# Patient Record
Sex: FEMALE | Race: BLACK OR AFRICAN AMERICAN | Employment: UNEMPLOYED | URBAN - METROPOLITAN AREA
[De-identification: names, ages, dates, MRNs, and addresses within clinical notes are randomized per-mention and may not be internally consistent; named-entity substitution may affect disease eponyms.]

---

## 2022-07-12 LAB — GBS, EXTERNAL RESULT: NOT DETECTED

## 2022-07-16 ENCOUNTER — HOSPITAL ENCOUNTER (OUTPATIENT)
Age: 37
Discharge: HOME | End: 2022-07-16
Attending: OBSTETRICS & GYNECOLOGY | Admitting: OBSTETRICS & GYNECOLOGY

## 2022-07-16 VITALS
OXYGEN SATURATION: 98 % | TEMPERATURE: 98.8 F | BODY MASS INDEX: 40.98 KG/M2 | HEART RATE: 85 BPM | DIASTOLIC BLOOD PRESSURE: 78 MMHG | RESPIRATION RATE: 16 BRPM | WEIGHT: 255 LBS | SYSTOLIC BLOOD PRESSURE: 161 MMHG | HEIGHT: 66 IN

## 2022-07-16 PROBLEM — O16.9 HYPERTENSION AFFECTING PREGNANCY, ANTEPARTUM: Status: ACTIVE | Noted: 2022-07-16

## 2022-07-16 LAB
ALBUMIN SERPL-MCNC: 3.5 GM/DL (ref 3.4–5)
ALP BLD-CCNC: 124 IU/L (ref 40–129)
ALT SERPL-CCNC: 9 U/L (ref 10–40)
AMPHETAMINES: NEGATIVE
ANION GAP SERPL CALCULATED.3IONS-SCNC: 11 MMOL/L (ref 4–16)
AST SERPL-CCNC: 10 IU/L (ref 15–37)
BACTERIA: ABNORMAL /HPF
BARBITURATE SCREEN URINE: NEGATIVE
BASOPHILS ABSOLUTE: 0 K/CU MM
BASOPHILS RELATIVE PERCENT: 0.3 % (ref 0–1)
BENZODIAZEPINE SCREEN, URINE: NEGATIVE
BILIRUB SERPL-MCNC: 0.2 MG/DL (ref 0–1)
BILIRUBIN URINE: NEGATIVE MG/DL
BLOOD, URINE: ABNORMAL
BUN BLDV-MCNC: 5 MG/DL (ref 6–23)
CALCIUM SERPL-MCNC: 8.8 MG/DL (ref 8.3–10.6)
CANNABINOID SCREEN URINE: NEGATIVE
CHLORIDE BLD-SCNC: 103 MMOL/L (ref 99–110)
CLARITY: CLEAR
CO2: 21 MMOL/L (ref 21–32)
COCAINE METABOLITE: NEGATIVE
COLOR: YELLOW
CREAT SERPL-MCNC: 0.5 MG/DL (ref 0.6–1.1)
CREATININE URINE: 137.5 MG/DL (ref 28–217)
DIFFERENTIAL TYPE: ABNORMAL
EOSINOPHILS ABSOLUTE: 0.1 K/CU MM
EOSINOPHILS RELATIVE PERCENT: 1.8 % (ref 0–3)
GFR AFRICAN AMERICAN: >60 ML/MIN/1.73M2
GFR NON-AFRICAN AMERICAN: >60 ML/MIN/1.73M2
GLUCOSE BLD-MCNC: 119 MG/DL (ref 70–99)
GLUCOSE, URINE: NEGATIVE MG/DL
HCT VFR BLD CALC: 35.8 % (ref 37–47)
HEMOGLOBIN: 11.4 GM/DL (ref 12.5–16)
IMMATURE NEUTROPHIL %: 0.4 % (ref 0–0.43)
KETONES, URINE: ABNORMAL MG/DL
LACTATE DEHYDROGENASE: 137 IU/L (ref 120–246)
LEUKOCYTE ESTERASE, URINE: NEGATIVE
LYMPHOCYTES ABSOLUTE: 2 K/CU MM
LYMPHOCYTES RELATIVE PERCENT: 28.1 % (ref 24–44)
MCH RBC QN AUTO: 29.8 PG (ref 27–31)
MCHC RBC AUTO-ENTMCNC: 31.8 % (ref 32–36)
MCV RBC AUTO: 93.5 FL (ref 78–100)
MONOCYTES ABSOLUTE: 0.4 K/CU MM
MONOCYTES RELATIVE PERCENT: 5.8 % (ref 0–4)
MUCUS: ABNORMAL HPF
NITRITE URINE, QUANTITATIVE: NEGATIVE
NUCLEATED RBC %: 0 %
OPIATES, URINE: NEGATIVE
OXYCODONE: NEGATIVE
PDW BLD-RTO: 14.7 % (ref 11.7–14.9)
PH, URINE: 6 (ref 5–8)
PHENCYCLIDINE, URINE: NEGATIVE
PLATELET # BLD: 225 K/CU MM (ref 140–440)
PMV BLD AUTO: 10.2 FL (ref 7.5–11.1)
POTASSIUM SERPL-SCNC: 3.9 MMOL/L (ref 3.5–5.1)
PROT/CREAT RATIO, UR: 0.2
PROTEIN UA: NEGATIVE MG/DL
RBC # BLD: 3.83 M/CU MM (ref 4.2–5.4)
RBC URINE: 3 /HPF (ref 0–6)
SEGMENTED NEUTROPHILS ABSOLUTE COUNT: 4.6 K/CU MM
SEGMENTED NEUTROPHILS RELATIVE PERCENT: 63.6 % (ref 36–66)
SODIUM BLD-SCNC: 135 MMOL/L (ref 135–145)
SPECIFIC GRAVITY UA: 1.02 (ref 1–1.03)
SQUAMOUS EPITHELIAL: 11 /HPF
TOTAL IMMATURE NEUTOROPHIL: 0.03 K/CU MM
TOTAL NUCLEATED RBC: 0 K/CU MM
TOTAL PROTEIN: 6.4 GM/DL (ref 6.4–8.2)
URIC ACID: 3.8 MG/DL (ref 2.6–6)
URINE TOTAL PROTEIN: 32.9 MG/DL
UROBILINOGEN, URINE: NORMAL MG/DL (ref 0.2–1)
WBC # BLD: 7.3 K/CU MM (ref 4–10.5)
WBC UA: 1 /HPF (ref 0–5)

## 2022-07-16 PROCEDURE — 99213 OFFICE O/P EST LOW 20 MIN: CPT

## 2022-07-16 PROCEDURE — 85025 COMPLETE CBC W/AUTO DIFF WBC: CPT

## 2022-07-16 PROCEDURE — 84156 ASSAY OF PROTEIN URINE: CPT

## 2022-07-16 PROCEDURE — 80053 COMPREHEN METABOLIC PANEL: CPT

## 2022-07-16 PROCEDURE — 83615 LACTATE (LD) (LDH) ENZYME: CPT

## 2022-07-16 PROCEDURE — 82570 ASSAY OF URINE CREATININE: CPT

## 2022-07-16 PROCEDURE — 81001 URINALYSIS AUTO W/SCOPE: CPT

## 2022-07-16 PROCEDURE — 84550 ASSAY OF BLOOD/URIC ACID: CPT

## 2022-07-16 PROCEDURE — 80307 DRUG TEST PRSMV CHEM ANLYZR: CPT

## 2022-07-16 RX ORDER — METHYLDOPA 500 MG/1
500 TABLET, FILM COATED ORAL 3 TIMES DAILY
Status: ON HOLD | COMMUNITY
End: 2022-07-20 | Stop reason: HOSPADM

## 2022-07-16 RX ORDER — ACETAMINOPHEN 325 MG/1
650 TABLET ORAL EVERY 4 HOURS PRN
Status: DISCONTINUED | OUTPATIENT
Start: 2022-07-16 | End: 2022-07-16 | Stop reason: HOSPADM

## 2022-07-16 NOTE — FLOWSHEET NOTE
EFM and TOCO on pt states baby has been active and moving pt denies any tender spots to touch on abdomen, pt denies any vaginal LOF does c/o some vaginal blood and slime, pt denies recent IC or SVE, pt states has been here in [facility] for two months, pt states came from [facility] pt states has had 3 prenatal visits, here but was getting regular PNC in [facility], denies issues with this pregnancy with exception of blood pressure, pt states is on Aldomet and has been on Aldomet most of pregnancy, pt states has mild headache, denies any epigastric discomfort or blurred vision Ob history taken pt is 37/1, pt states was on Nifedipine at first due to chronic hypertension.

## 2022-07-16 NOTE — FLOWSHEET NOTE
Presents to L/D via wheelchair with c./o vaginal blood and slime shown to LT 05 instructed to obtain urine for CCMS change into gown call when ready.

## 2022-07-17 ENCOUNTER — HOSPITAL ENCOUNTER (INPATIENT)
Age: 37
LOS: 3 days | Discharge: HOME | DRG: 560 | End: 2022-07-20
Attending: OBSTETRICS & GYNECOLOGY | Admitting: OBSTETRICS & GYNECOLOGY
Payer: MEDICAID

## 2022-07-17 PROBLEM — O16.3 ELEVATED BLOOD PRESSURE AFFECTING PREGNANCY IN THIRD TRIMESTER, ANTEPARTUM: Status: ACTIVE | Noted: 2022-07-17

## 2022-07-17 LAB
ABO/RH: NORMAL
AMPHETAMINES: NEGATIVE
ANTIBODY SCREEN: NEGATIVE
BARBITURATE SCREEN URINE: NEGATIVE
BENZODIAZEPINE SCREEN, URINE: NEGATIVE
CANNABINOID SCREEN URINE: NEGATIVE
COCAINE METABOLITE: NEGATIVE
HCT VFR BLD CALC: 35.3 % (ref 37–47)
HEMOGLOBIN: 10.9 GM/DL (ref 12.5–16)
MCH RBC QN AUTO: 29.1 PG (ref 27–31)
MCHC RBC AUTO-ENTMCNC: 30.9 % (ref 32–36)
MCV RBC AUTO: 94.1 FL (ref 78–100)
OPIATES, URINE: NEGATIVE
OXYCODONE: NEGATIVE
PDW BLD-RTO: 14.9 % (ref 11.7–14.9)
PHENCYCLIDINE, URINE: NEGATIVE
PLATELET # BLD: 235 K/CU MM (ref 140–440)
PMV BLD AUTO: 10.6 FL (ref 7.5–11.1)
RBC # BLD: 3.75 M/CU MM (ref 4.2–5.4)
WBC # BLD: 7.8 K/CU MM (ref 4–10.5)

## 2022-07-17 PROCEDURE — 1220000000 HC SEMI PRIVATE OB R&B

## 2022-07-17 PROCEDURE — 86850 RBC ANTIBODY SCREEN: CPT

## 2022-07-17 PROCEDURE — 86900 BLOOD TYPING SEROLOGIC ABO: CPT

## 2022-07-17 PROCEDURE — 2500000003 HC RX 250 WO HCPCS

## 2022-07-17 PROCEDURE — 80307 DRUG TEST PRSMV CHEM ANLYZR: CPT

## 2022-07-17 PROCEDURE — 2580000003 HC RX 258

## 2022-07-17 PROCEDURE — 6360000002 HC RX W HCPCS

## 2022-07-17 PROCEDURE — 86901 BLOOD TYPING SEROLOGIC RH(D): CPT

## 2022-07-17 PROCEDURE — 6370000000 HC RX 637 (ALT 250 FOR IP)

## 2022-07-17 PROCEDURE — 85027 COMPLETE CBC AUTOMATED: CPT

## 2022-07-17 RX ORDER — SODIUM CHLORIDE, SODIUM LACTATE, POTASSIUM CHLORIDE, CALCIUM CHLORIDE 600; 310; 30; 20 MG/100ML; MG/100ML; MG/100ML; MG/100ML
INJECTION, SOLUTION INTRAVENOUS CONTINUOUS
Status: DISCONTINUED | OUTPATIENT
Start: 2022-07-17 | End: 2022-07-18

## 2022-07-17 RX ORDER — LIDOCAINE HYDROCHLORIDE 10 MG/ML
30 INJECTION, SOLUTION EPIDURAL; INFILTRATION; INTRACAUDAL; PERINEURAL PRN
Status: DISCONTINUED | OUTPATIENT
Start: 2022-07-17 | End: 2022-07-18

## 2022-07-17 RX ORDER — ONDANSETRON 2 MG/ML
4 INJECTION INTRAMUSCULAR; INTRAVENOUS EVERY 6 HOURS PRN
Status: DISCONTINUED | OUTPATIENT
Start: 2022-07-17 | End: 2022-07-18

## 2022-07-17 RX ORDER — SODIUM CHLORIDE 0.9 % (FLUSH) 0.9 %
5-40 SYRINGE (ML) INJECTION PRN
Status: DISCONTINUED | OUTPATIENT
Start: 2022-07-17 | End: 2022-07-18

## 2022-07-17 RX ORDER — SODIUM CHLORIDE 9 MG/ML
25 INJECTION, SOLUTION INTRAVENOUS PRN
Status: DISCONTINUED | OUTPATIENT
Start: 2022-07-17 | End: 2022-07-18

## 2022-07-17 RX ORDER — FENTANYL CITRATE 50 UG/ML
100 INJECTION, SOLUTION INTRAMUSCULAR; INTRAVENOUS
Status: DISCONTINUED | OUTPATIENT
Start: 2022-07-17 | End: 2022-07-18

## 2022-07-17 RX ORDER — FAMOTIDINE 10 MG/ML
20 INJECTION, SOLUTION INTRAVENOUS 2 TIMES DAILY PRN
Status: DISCONTINUED | OUTPATIENT
Start: 2022-07-17 | End: 2022-07-18

## 2022-07-17 RX ORDER — DOCUSATE SODIUM 100 MG/1
100 CAPSULE, LIQUID FILLED ORAL 2 TIMES DAILY
Status: DISCONTINUED | OUTPATIENT
Start: 2022-07-17 | End: 2022-07-17

## 2022-07-17 RX ADMIN — SODIUM CHLORIDE, POTASSIUM CHLORIDE, SODIUM LACTATE AND CALCIUM CHLORIDE: 600; 310; 30; 20 INJECTION, SOLUTION INTRAVENOUS at 22:48

## 2022-07-17 RX ADMIN — Medication 1 MILLI-UNITS/MIN: at 20:57

## 2022-07-17 RX ADMIN — SODIUM CHLORIDE, POTASSIUM CHLORIDE, SODIUM LACTATE AND CALCIUM CHLORIDE: 600; 310; 30; 20 INJECTION, SOLUTION INTRAVENOUS at 09:50

## 2022-07-17 RX ADMIN — Medication 25 MCG: at 09:52

## 2022-07-17 RX ADMIN — FAMOTIDINE 20 MG: 10 INJECTION, SOLUTION INTRAVENOUS at 19:22

## 2022-07-17 RX ADMIN — Medication 25 MCG: at 14:11

## 2022-07-17 RX ADMIN — SODIUM CHLORIDE, POTASSIUM CHLORIDE, SODIUM LACTATE AND CALCIUM CHLORIDE: 600; 310; 30; 20 INJECTION, SOLUTION INTRAVENOUS at 16:00

## 2022-07-17 ASSESSMENT — PAIN DESCRIPTION - ORIENTATION
ORIENTATION: LOWER

## 2022-07-17 ASSESSMENT — PAIN DESCRIPTION - LOCATION
LOCATION: BACK

## 2022-07-17 ASSESSMENT — PAIN DESCRIPTION - DESCRIPTORS
DESCRIPTORS: DISCOMFORT

## 2022-07-17 ASSESSMENT — PAIN SCALES - GENERAL
PAINLEVEL_OUTOF10: 2
PAINLEVEL_OUTOF10: 0
PAINLEVEL_OUTOF10: 2

## 2022-07-17 ASSESSMENT — PAIN DESCRIPTION - PAIN TYPE
TYPE: ACUTE PAIN

## 2022-07-17 ASSESSMENT — PAIN DESCRIPTION - ONSET
ONSET: ON-GOING

## 2022-07-17 ASSESSMENT — PAIN DESCRIPTION - FREQUENCY
FREQUENCY: INTERMITTENT

## 2022-07-17 ASSESSMENT — PAIN - FUNCTIONAL ASSESSMENT
PAIN_FUNCTIONAL_ASSESSMENT: ACTIVITIES ARE NOT PREVENTED

## 2022-07-17 NOTE — PLAN OF CARE
Problem: Vaginal Birth or  Section  Goal: Fetal and maternal status remain reassuring during the birth process  Description:  Birth OB-Pregnancy care plan goal which identifies if the fetal and maternal status remain reassuring during the birth process  Outcome: Progressing     Problem: Postpartum  Goal: Experiences normal postpartum course  Description:  Postpartum OB-Pregnancy care plan goal which identifies if the mother is experiencing a normal postpartum course  Outcome: Progressing  Goal: Appropriate maternal -  bonding  Description:  Postpartum OB-Pregnancy care plan goal which identifies if the mother and  are bonding appropriately  Outcome: Progressing  Goal: Establishment of infant feeding pattern  Description:  Postpartum OB-Pregnancy care plan goal which identifies if the mother is establishing a feeding pattern with their   Outcome: Progressing  Goal: Incisions, wounds, or drain sites healing without S/S of infection  Outcome: Progressing     Problem: Pain  Goal: Verbalizes/displays adequate comfort level or baseline comfort level  Outcome: Progressing     Problem: Infection - Adult  Goal: Absence of infection during hospitalization  Outcome: Progressing     Problem: Discharge Planning  Goal: Discharge to home or other facility with appropriate resources  Outcome: Progressing

## 2022-07-17 NOTE — PROGRESS NOTES
Department of Obstetrics and Gynecology  Labor and Delivery   Midwifery Progress Note      SUBJECTIVE:  Pt comfortable but feeling some mild cramping. OBJECTIVE:      Fetal heart rate:       Baseline Heart Rate:  125        Accelerations:  present       Variability:  Moderate with periods of minimal overall reassuring        Decelerations:  absent         Contraction frequency: 3 minutes    Membranes:  Intact    Cervix:         Dilation:  Closed         Effacement:  Long         Station:  -3         Position:  mid             ASSESSMENT   Pt comfortable   FHT- Reactive   SVE- unchanged    PLAN:  Will place 2nd Cytotec   Anticipate active labor     I have collaborated and updated Dr Ginette Pennington and the MD agrees with the current POC.      ISABELL Watkins - C[place]

## 2022-07-17 NOTE — PROGRESS NOTES
Department of Obstetrics and Gynecology  Labor and Delivery   Midwifery Progress Note      SUBJECTIVE:  Pt comfortable with Contractions    OBJECTIVE:      Fetal heart rate:       Baseline Heart Rate:  135        Accelerations:  present       Variability:  moderate       Decelerations:  absent         Contraction frequency: Irregular     Membranes:  Intact    Cervix:         Dilation:  1-2 cm         Effacement:  75%         Station:  -3         Position:  posterior             ASSESSMENT   Pt Comfortable with Contractions  Discussed leal ind vs pit ind. Pt would like to Try Leal. Attempted to place Leal unsuccessful- Pt tolerated well. opt for Pitocin induction  SVE- 1-2/ 70/ ballotable    PLAN:  Will start pitocin   Anticipate active labor     I have collaborated and updated Dr Thien Martin and the MD agrees with the current POC.      ISABELL Dumont CNM

## 2022-07-17 NOTE — H&P
Department of Obstetrics and Gynecology   Obstetrics History and Physical        CHIEF COMPLAINT: Here For Cytotec induction for CHTN        HISTORY OF PRESENT ILLNESS:      The patient is a 44 y.o. female at 42w2d. OB History          3    Para   2    Term   2            AB        Living   2         SAB        IAB        Ectopic        Molar        Multiple        Live Births   2            Patient presents with a chief complaint as above and is being admitted for Chronic Hypertension    Estimated Due Date: Estimated Date of Delivery: 22    PRENATAL CARE:    Complicated by: chronic hypertension and obesity    PAST OB HISTORY  OB History          3    Para   2    Term   2            AB        Living   2         SAB        IAB        Ectopic        Molar        Multiple        Live Births   2                Past Medical History:        Diagnosis Date    Hypertension      Past Surgical History:        Procedure Laterality Date    DILATION AND CURETTAGE OF UTERUS      @ 8 yeras ago missed AB     Allergies:  Patient has no known allergies.   Social History:    Social History     Socioeconomic History    Marital status: Single     Spouse name: Not on file    Number of children: Not on file    Years of education: Not on file    Highest education level: Not on file   Occupational History    Not on file   Tobacco Use    Smoking status: Never    Smokeless tobacco: Never   Substance and Sexual Activity    Alcohol use: Not Currently    Drug use: Never    Sexual activity: Yes     Partners: Male   Other Topics Concern    Not on file   Social History Narrative    Not on file     Social Determinants of Health     Financial Resource Strain: Not on file   Food Insecurity: Not on file   Transportation Needs: Not on file   Physical Activity: Not on file   Stress: Not on file   Social Connections: Not on file   Intimate Partner Violence: Not on file   Housing Stability: Not on file     Family History: Problem Relation Age of Onset    Diabetes Mother      Medications Prior to Admission:  Medications Prior to Admission: methyldopa (ALDOMET) 500 MG tablet, Take 500 mg by mouth in the morning and 500 mg at noon and 500 mg before bedtime. REVIEW OF SYSTEMS:    CONSTITUTIONAL:  negative  RESPIRATORY:  negative  CARDIOVASCULAR:  negative  GASTROINTESTINAL:  negative  ALLERGIC/IMMUNOLOGIC:  negative  NEUROLOGICAL:  negative  BEHAVIOR/PSYCH:  negative    PHYSICAL EXAM:  There were no vitals taken for this visit. Lab Results   Component Value Date    WBC 7.3 07/16/2022    HGB 11.4 (L) 07/16/2022    HCT 35.8 (L) 07/16/2022    MCV 93.5 07/16/2022     07/16/2022       Blood Type O+  GBS- Negative     Pending Received Prenatal record From [facility]   Rubella-   RPR-  HIV-  Hep B-         General appearance:  awake, alert, cooperative, no apparent distress, and appears stated age  Neurologic:  Awake, alert, oriented to name, place and time. Lungs:  No increased work of breathing, good air exchange  Abdomen:  Soft, non tender, gravid, consistent with her gestational age,     Fetal heart rate:    Baseline Heart Rate:  135        Accelerations:  present       Long Term Variability:  moderate       Decelerations:  absent         Pelvis:  Adequate pelvis  Cervix: Closed Long firm -2      Contraction frequency:  none     Membranes:  Intact    ASSESSMENT AND PLAN:    Labor: Admit, anticipate normal delivery, routine labor orders  Fetus: Reassuring  GBS:negative  Other: plan cytotec for cervical ripening and labor induction      I have collaborated and updated Dr. Slick Thomas  and the MD agrees with the current POC.     Carl Stevens, ISABELL - C[place]TON

## 2022-07-17 NOTE — DISCHARGE INSTRUCTIONS
LABOR    Call your doctor, or come to the hospital, if you have:    Contractions coming about every 3-5 minutes apart, for about one hour. Labor contractions are usually strong enough that you can not walk or talk while having the contractions. ( Contractions can feel like menstrual cramps, tightening in your abdomen, rectal pressure or a backache. This feeling comes and goes.)    Vaginal leaking of fluid. Heavy, bright red bleeding, like the days of your period. ( A little bloody show or spotting is normal in labor.)    ALWAYS CALL YOUR DOCTOR IF YOU:    Notice decreased movement of your baby, different from what you are used to. Have heavy, bright red bleeding, like the heavy days of your periods. Have vaginal leaking of fluid.   Return to [facility] tomorrow at 0800 for induction    Activity as tolerated    Diet as tolerated

## 2022-07-18 ENCOUNTER — ANESTHESIA (OUTPATIENT)
Dept: LABOR AND DELIVERY | Age: 37
DRG: 560 | End: 2022-07-18
Payer: MEDICAID

## 2022-07-18 ENCOUNTER — ANESTHESIA EVENT (OUTPATIENT)
Dept: LABOR AND DELIVERY | Age: 37
DRG: 560 | End: 2022-07-18
Payer: MEDICAID

## 2022-07-18 PROBLEM — Z3A.37 37 WEEKS GESTATION OF PREGNANCY: Status: ACTIVE | Noted: 2022-07-18

## 2022-07-18 LAB
ALBUMIN SERPL-MCNC: 3.2 GM/DL (ref 3.4–5)
ALP BLD-CCNC: 118 IU/L (ref 40–129)
ALT SERPL-CCNC: 10 U/L (ref 10–40)
ANION GAP SERPL CALCULATED.3IONS-SCNC: 12 MMOL/L (ref 4–16)
AST SERPL-CCNC: 14 IU/L (ref 15–37)
BASOPHILS ABSOLUTE: 0 K/CU MM
BASOPHILS RELATIVE PERCENT: 0.2 % (ref 0–1)
BILIRUB SERPL-MCNC: 0.3 MG/DL (ref 0–1)
BUN BLDV-MCNC: 5 MG/DL (ref 6–23)
CALCIUM SERPL-MCNC: 8.8 MG/DL (ref 8.3–10.6)
CHLORIDE BLD-SCNC: 101 MMOL/L (ref 99–110)
CO2: 21 MMOL/L (ref 21–32)
CREAT SERPL-MCNC: 0.4 MG/DL (ref 0.6–1.1)
DIFFERENTIAL TYPE: ABNORMAL
EKG ATRIAL RATE: 78 BPM
EKG DIAGNOSIS: NORMAL
EKG P AXIS: 25 DEGREES
EKG P-R INTERVAL: 158 MS
EKG Q-T INTERVAL: 374 MS
EKG QRS DURATION: 94 MS
EKG QTC CALCULATION (BAZETT): 426 MS
EKG R AXIS: 16 DEGREES
EKG T AXIS: 30 DEGREES
EKG VENTRICULAR RATE: 78 BPM
EOSINOPHILS ABSOLUTE: 0.1 K/CU MM
EOSINOPHILS RELATIVE PERCENT: 1 % (ref 0–3)
GFR AFRICAN AMERICAN: >60 ML/MIN/1.73M2
GFR NON-AFRICAN AMERICAN: >60 ML/MIN/1.73M2
GLUCOSE BLD-MCNC: 97 MG/DL (ref 70–99)
HCT VFR BLD CALC: 36.6 % (ref 37–47)
HEMOGLOBIN: 11.6 GM/DL (ref 12.5–16)
IMMATURE NEUTROPHIL %: 0.3 % (ref 0–0.43)
LYMPHOCYTES ABSOLUTE: 2 K/CU MM
LYMPHOCYTES RELATIVE PERCENT: 22 % (ref 24–44)
MCH RBC QN AUTO: 29.1 PG (ref 27–31)
MCHC RBC AUTO-ENTMCNC: 31.7 % (ref 32–36)
MCV RBC AUTO: 91.7 FL (ref 78–100)
MONOCYTES ABSOLUTE: 0.5 K/CU MM
MONOCYTES RELATIVE PERCENT: 5.3 % (ref 0–4)
NUCLEATED RBC %: 0 %
PDW BLD-RTO: 14.6 % (ref 11.7–14.9)
PLATELET # BLD: 203 K/CU MM (ref 140–440)
PMV BLD AUTO: 9.9 FL (ref 7.5–11.1)
POTASSIUM SERPL-SCNC: 3.7 MMOL/L (ref 3.5–5.1)
PRO-BNP: 9.74 PG/ML
RBC # BLD: 3.99 M/CU MM (ref 4.2–5.4)
SEGMENTED NEUTROPHILS ABSOLUTE COUNT: 6.3 K/CU MM
SEGMENTED NEUTROPHILS RELATIVE PERCENT: 71.2 % (ref 36–66)
SODIUM BLD-SCNC: 134 MMOL/L (ref 135–145)
TOTAL IMMATURE NEUTOROPHIL: 0.03 K/CU MM
TOTAL NUCLEATED RBC: 0 K/CU MM
TOTAL PROTEIN: 5.9 GM/DL (ref 6.4–8.2)
TROPONIN T: <0.01 NG/ML
WBC # BLD: 8.9 K/CU MM (ref 4–10.5)

## 2022-07-18 PROCEDURE — 6360000002 HC RX W HCPCS: Performed by: OBSTETRICS & GYNECOLOGY

## 2022-07-18 PROCEDURE — 84484 ASSAY OF TROPONIN QUANT: CPT

## 2022-07-18 PROCEDURE — 80053 COMPREHEN METABOLIC PANEL: CPT

## 2022-07-18 PROCEDURE — 3E033VJ INTRODUCTION OF OTHER HORMONE INTO PERIPHERAL VEIN, PERCUTANEOUS APPROACH: ICD-10-PCS | Performed by: OBSTETRICS & GYNECOLOGY

## 2022-07-18 PROCEDURE — 6370000000 HC RX 637 (ALT 250 FOR IP)

## 2022-07-18 PROCEDURE — 6360000002 HC RX W HCPCS

## 2022-07-18 PROCEDURE — 6360000002 HC RX W HCPCS: Performed by: NURSE ANESTHETIST, CERTIFIED REGISTERED

## 2022-07-18 PROCEDURE — 2580000003 HC RX 258: Performed by: OBSTETRICS & GYNECOLOGY

## 2022-07-18 PROCEDURE — 93005 ELECTROCARDIOGRAM TRACING: CPT | Performed by: OBSTETRICS & GYNECOLOGY

## 2022-07-18 PROCEDURE — 1220000000 HC SEMI PRIVATE OB R&B

## 2022-07-18 PROCEDURE — 7200000001 HC VAGINAL DELIVERY

## 2022-07-18 PROCEDURE — 3700000025 EPIDURAL BLOCK: Performed by: NURSE ANESTHETIST, CERTIFIED REGISTERED

## 2022-07-18 PROCEDURE — 93010 ELECTROCARDIOGRAM REPORT: CPT | Performed by: INTERNAL MEDICINE

## 2022-07-18 PROCEDURE — 6370000000 HC RX 637 (ALT 250 FOR IP): Performed by: OBSTETRICS & GYNECOLOGY

## 2022-07-18 PROCEDURE — 83880 ASSAY OF NATRIURETIC PEPTIDE: CPT

## 2022-07-18 PROCEDURE — 59409 OBSTETRICAL CARE: CPT | Performed by: OBSTETRICS & GYNECOLOGY

## 2022-07-18 PROCEDURE — 85025 COMPLETE CBC W/AUTO DIFF WBC: CPT

## 2022-07-18 PROCEDURE — 3E0P7VZ INTRODUCTION OF HORMONE INTO FEMALE REPRODUCTIVE, VIA NATURAL OR ARTIFICIAL OPENING: ICD-10-PCS | Performed by: OBSTETRICS & GYNECOLOGY

## 2022-07-18 PROCEDURE — 51702 INSERT TEMP BLADDER CATH: CPT

## 2022-07-18 RX ORDER — MISOPROSTOL 200 UG/1
TABLET ORAL
Status: COMPLETED
Start: 2022-07-18 | End: 2022-07-18

## 2022-07-18 RX ORDER — OXYCODONE HYDROCHLORIDE 5 MG/1
10 TABLET ORAL EVERY 4 HOURS PRN
Status: DISCONTINUED | OUTPATIENT
Start: 2022-07-18 | End: 2022-07-20 | Stop reason: HOSPADM

## 2022-07-18 RX ORDER — ROPIVACAINE HYDROCHLORIDE 2 MG/ML
INJECTION, SOLUTION EPIDURAL; INFILTRATION; PERINEURAL PRN
Status: DISCONTINUED | OUTPATIENT
Start: 2022-07-18 | End: 2022-07-18 | Stop reason: SDUPTHER

## 2022-07-18 RX ORDER — SODIUM CHLORIDE 9 MG/ML
INJECTION, SOLUTION INTRAVENOUS PRN
Status: DISCONTINUED | OUTPATIENT
Start: 2022-07-18 | End: 2022-07-20 | Stop reason: HOSPADM

## 2022-07-18 RX ORDER — ROPIVACAINE HYDROCHLORIDE 2 MG/ML
12 INJECTION, SOLUTION EPIDURAL; INFILTRATION; PERINEURAL CONTINUOUS
Status: DISCONTINUED | OUTPATIENT
Start: 2022-07-18 | End: 2022-07-18

## 2022-07-18 RX ORDER — SODIUM CHLORIDE, SODIUM LACTATE, POTASSIUM CHLORIDE, CALCIUM CHLORIDE 600; 310; 30; 20 MG/100ML; MG/100ML; MG/100ML; MG/100ML
INJECTION, SOLUTION INTRAVENOUS CONTINUOUS
Status: DISCONTINUED | OUTPATIENT
Start: 2022-07-18 | End: 2022-07-20 | Stop reason: HOSPADM

## 2022-07-18 RX ORDER — SODIUM CHLORIDE 0.9 % (FLUSH) 0.9 %
5-40 SYRINGE (ML) INJECTION EVERY 12 HOURS SCHEDULED
Status: DISCONTINUED | OUTPATIENT
Start: 2022-07-18 | End: 2022-07-20 | Stop reason: HOSPADM

## 2022-07-18 RX ORDER — ACETAMINOPHEN 500 MG
1000 TABLET ORAL EVERY 8 HOURS
Status: DISCONTINUED | OUTPATIENT
Start: 2022-07-18 | End: 2022-07-20 | Stop reason: HOSPADM

## 2022-07-18 RX ORDER — SODIUM CHLORIDE 0.9 % (FLUSH) 0.9 %
5-40 SYRINGE (ML) INJECTION PRN
Status: DISCONTINUED | OUTPATIENT
Start: 2022-07-18 | End: 2022-07-20 | Stop reason: HOSPADM

## 2022-07-18 RX ORDER — LABETALOL HYDROCHLORIDE 5 MG/ML
40 INJECTION, SOLUTION INTRAVENOUS
Status: ACTIVE | OUTPATIENT
Start: 2022-07-18 | End: 2022-07-18

## 2022-07-18 RX ORDER — SIMETHICONE 80 MG
80 TABLET,CHEWABLE ORAL EVERY 6 HOURS PRN
Status: DISCONTINUED | OUTPATIENT
Start: 2022-07-18 | End: 2022-07-20 | Stop reason: HOSPADM

## 2022-07-18 RX ORDER — OXYCODONE HYDROCHLORIDE 5 MG/1
5 TABLET ORAL EVERY 4 HOURS PRN
Status: DISCONTINUED | OUTPATIENT
Start: 2022-07-18 | End: 2022-07-20 | Stop reason: HOSPADM

## 2022-07-18 RX ORDER — METHYLERGONOVINE MALEATE 0.2 MG/ML
200 INJECTION INTRAVENOUS PRN
Status: DISCONTINUED | OUTPATIENT
Start: 2022-07-18 | End: 2022-07-20 | Stop reason: HOSPADM

## 2022-07-18 RX ORDER — METHYLDOPA 250 MG/1
500 TABLET, FILM COATED ORAL 3 TIMES DAILY
Status: DISCONTINUED | OUTPATIENT
Start: 2022-07-18 | End: 2022-07-18

## 2022-07-18 RX ORDER — LANOLIN 100 %
OINTMENT (GRAM) TOPICAL PRN
Status: DISCONTINUED | OUTPATIENT
Start: 2022-07-18 | End: 2022-07-20 | Stop reason: HOSPADM

## 2022-07-18 RX ORDER — FAMOTIDINE 20 MG/1
20 TABLET, FILM COATED ORAL 2 TIMES DAILY PRN
Status: DISCONTINUED | OUTPATIENT
Start: 2022-07-18 | End: 2022-07-20 | Stop reason: HOSPADM

## 2022-07-18 RX ORDER — MISOPROSTOL 200 UG/1
800 TABLET ORAL PRN
Status: DISCONTINUED | OUTPATIENT
Start: 2022-07-18 | End: 2022-07-18

## 2022-07-18 RX ORDER — LABETALOL 200 MG/1
200 TABLET, FILM COATED ORAL EVERY 12 HOURS SCHEDULED
Status: DISCONTINUED | OUTPATIENT
Start: 2022-07-18 | End: 2022-07-20 | Stop reason: HOSPADM

## 2022-07-18 RX ORDER — NALOXONE HYDROCHLORIDE 0.4 MG/ML
INJECTION, SOLUTION INTRAMUSCULAR; INTRAVENOUS; SUBCUTANEOUS PRN
Status: DISCONTINUED | OUTPATIENT
Start: 2022-07-18 | End: 2022-07-18

## 2022-07-18 RX ORDER — HYDRALAZINE HYDROCHLORIDE 20 MG/ML
10 INJECTION INTRAMUSCULAR; INTRAVENOUS
Status: DISPENSED | OUTPATIENT
Start: 2022-07-18 | End: 2022-07-18

## 2022-07-18 RX ORDER — DOCUSATE SODIUM 100 MG/1
100 CAPSULE, LIQUID FILLED ORAL 2 TIMES DAILY
Status: DISCONTINUED | OUTPATIENT
Start: 2022-07-18 | End: 2022-07-20 | Stop reason: HOSPADM

## 2022-07-18 RX ORDER — NICOTINE 21 MG/24HR
1 PATCH, TRANSDERMAL 24 HOURS TRANSDERMAL DAILY
Status: DISCONTINUED | OUTPATIENT
Start: 2022-07-18 | End: 2022-07-20 | Stop reason: HOSPADM

## 2022-07-18 RX ORDER — IBUPROFEN 800 MG/1
800 TABLET ORAL EVERY 8 HOURS
Status: DISCONTINUED | OUTPATIENT
Start: 2022-07-18 | End: 2022-07-20 | Stop reason: HOSPADM

## 2022-07-18 RX ORDER — MISOPROSTOL 200 UG/1
800 TABLET ORAL PRN
Status: DISCONTINUED | OUTPATIENT
Start: 2022-07-18 | End: 2022-07-20 | Stop reason: HOSPADM

## 2022-07-18 RX ORDER — HYDRALAZINE HYDROCHLORIDE 20 MG/ML
INJECTION INTRAMUSCULAR; INTRAVENOUS
Status: COMPLETED
Start: 2022-07-18 | End: 2022-07-18

## 2022-07-18 RX ORDER — LABETALOL HYDROCHLORIDE 5 MG/ML
20 INJECTION, SOLUTION INTRAVENOUS
Status: ACTIVE | OUTPATIENT
Start: 2022-07-18 | End: 2022-07-18

## 2022-07-18 RX ORDER — HYDRALAZINE HYDROCHLORIDE 20 MG/ML
5 INJECTION INTRAMUSCULAR; INTRAVENOUS
Status: COMPLETED | OUTPATIENT
Start: 2022-07-18 | End: 2022-07-18

## 2022-07-18 RX ORDER — CARBOPROST TROMETHAMINE 250 UG/ML
250 INJECTION, SOLUTION INTRAMUSCULAR PRN
Status: DISCONTINUED | OUTPATIENT
Start: 2022-07-18 | End: 2022-07-18

## 2022-07-18 RX ORDER — CARBOPROST TROMETHAMINE 250 UG/ML
250 INJECTION, SOLUTION INTRAMUSCULAR
Status: ACTIVE | OUTPATIENT
Start: 2022-07-18 | End: 2022-07-18

## 2022-07-18 RX ORDER — MAGNESIUM SULFATE 4 G/50ML
4000 INJECTION INTRAVENOUS ONCE
Status: COMPLETED | OUTPATIENT
Start: 2022-07-18 | End: 2022-07-18

## 2022-07-18 RX ORDER — CALCIUM GLUCONATE 94 MG/ML
1000 INJECTION, SOLUTION INTRAVENOUS PRN
Status: DISCONTINUED | OUTPATIENT
Start: 2022-07-18 | End: 2022-07-20 | Stop reason: HOSPADM

## 2022-07-18 RX ORDER — ONDANSETRON 4 MG/1
4 TABLET, ORALLY DISINTEGRATING ORAL EVERY 6 HOURS PRN
Status: DISCONTINUED | OUTPATIENT
Start: 2022-07-18 | End: 2022-07-20 | Stop reason: HOSPADM

## 2022-07-18 RX ADMIN — DOCUSATE SODIUM 100 MG: 100 CAPSULE, LIQUID FILLED ORAL at 15:04

## 2022-07-18 RX ADMIN — Medication 10 ML: at 22:00

## 2022-07-18 RX ADMIN — HYDRALAZINE HYDROCHLORIDE 5 MG: 20 INJECTION INTRAMUSCULAR; INTRAVENOUS at 07:48

## 2022-07-18 RX ADMIN — ACETAMINOPHEN 1000 MG: 500 TABLET ORAL at 19:41

## 2022-07-18 RX ADMIN — ROPIVACAINE HYDROCHLORIDE 5 ML: 2 INJECTION, SOLUTION EPIDURAL; INFILTRATION; PERINEURAL at 03:15

## 2022-07-18 RX ADMIN — LABETALOL HYDROCHLORIDE 200 MG: 200 TABLET, FILM COATED ORAL at 19:23

## 2022-07-18 RX ADMIN — MISOPROSTOL 800 MCG: 200 TABLET ORAL at 06:24

## 2022-07-18 RX ADMIN — IBUPROFEN 800 MG: 800 TABLET, FILM COATED ORAL at 23:38

## 2022-07-18 RX ADMIN — IBUPROFEN 800 MG: 800 TABLET, FILM COATED ORAL at 15:05

## 2022-07-18 RX ADMIN — ACETAMINOPHEN 1000 MG: 500 TABLET ORAL at 07:34

## 2022-07-18 RX ADMIN — MAGNESIUM SULFATE HEPTAHYDRATE 4000 MG: 80 INJECTION, SOLUTION INTRAVENOUS at 09:10

## 2022-07-18 RX ADMIN — LABETALOL HYDROCHLORIDE 200 MG: 200 TABLET, FILM COATED ORAL at 07:34

## 2022-07-18 RX ADMIN — Medication 909 MILLI-UNITS/MIN: at 05:31

## 2022-07-18 RX ADMIN — MAGNESIUM SULFATE HEPTAHYDRATE 2000 MG/HR: 40 INJECTION, SOLUTION INTRAVENOUS at 09:29

## 2022-07-18 RX ADMIN — FAMOTIDINE 20 MG: 20 TABLET ORAL at 19:35

## 2022-07-18 RX ADMIN — ROPIVACAINE HYDROCHLORIDE 5 ML: 2 INJECTION, SOLUTION EPIDURAL; INFILTRATION; PERINEURAL at 03:08

## 2022-07-18 RX ADMIN — DOCUSATE SODIUM 100 MG: 100 CAPSULE, LIQUID FILLED ORAL at 19:40

## 2022-07-18 RX ADMIN — Medication 87.3 MILLI-UNITS/MIN: at 06:51

## 2022-07-18 ASSESSMENT — PAIN DESCRIPTION - FREQUENCY
FREQUENCY: INTERMITTENT

## 2022-07-18 ASSESSMENT — PAIN DESCRIPTION - LOCATION
LOCATION: ABDOMEN
LOCATION: BACK

## 2022-07-18 ASSESSMENT — PAIN SCALES - GENERAL
PAINLEVEL_OUTOF10: 0
PAINLEVEL_OUTOF10: 2
PAINLEVEL_OUTOF10: 5
PAINLEVEL_OUTOF10: 0
PAINLEVEL_OUTOF10: 2
PAINLEVEL_OUTOF10: 0
PAINLEVEL_OUTOF10: 2
PAINLEVEL_OUTOF10: 1
PAINLEVEL_OUTOF10: 5
PAINLEVEL_OUTOF10: 0
PAINLEVEL_OUTOF10: 2

## 2022-07-18 ASSESSMENT — PAIN DESCRIPTION - DIRECTION: RADIATING_TOWARDS: PELVIS

## 2022-07-18 ASSESSMENT — PAIN DESCRIPTION - DESCRIPTORS
DESCRIPTORS: DISCOMFORT;CRAMPING
DESCRIPTORS: CRAMPING;DISCOMFORT
DESCRIPTORS: CRAMPING
DESCRIPTORS: CRAMPING

## 2022-07-18 ASSESSMENT — PAIN DESCRIPTION - ORIENTATION
ORIENTATION: LOWER
ORIENTATION: LOWER
ORIENTATION: ANTERIOR
ORIENTATION: LOWER

## 2022-07-18 ASSESSMENT — PAIN - FUNCTIONAL ASSESSMENT
PAIN_FUNCTIONAL_ASSESSMENT: ACTIVITIES ARE NOT PREVENTED
PAIN_FUNCTIONAL_ASSESSMENT: PREVENTS OR INTERFERES SOME ACTIVE ACTIVITIES AND ADLS

## 2022-07-18 ASSESSMENT — PAIN DESCRIPTION - PAIN TYPE
TYPE: ACUTE PAIN
TYPE: ACUTE PAIN

## 2022-07-18 ASSESSMENT — PAIN DESCRIPTION - ONSET
ONSET: AWAKENED FROM SLEEP
ONSET: ON-GOING

## 2022-07-18 NOTE — ANESTHESIA PROCEDURE NOTES
Epidural Block    Patient location during procedure: OB  Start time: 7/18/2022 2:54 AM  End time: 7/18/2022 3:20 AM  Reason for block: labor epidural  Staffing  Performed: resident/CRNA   Resident/CRNA: ISABELL Aj CRNA  Epidural  Patient position: sitting  Prep: ChloraPrep  Patient monitoring: continuous pulse ox and frequent blood pressure checks  Approach: midline  Location: L3-4  Injection technique: LIZETH saline  Provider prep: mask  Needle  Needle type: [employer]   Needle gauge: 17 G  Needle insertion depth: 6 cm  Catheter type: side hole  Catheter size: 19 G  Test dose: negativeCatheter Secured: tape and tegaderm  Assessment  Sensory level: T6  Hemodynamics: stable  Attempts: 1  Outcomes: patient tolerated procedure well  Preanesthetic Checklist  Completed: patient identified, IV checked, risks and benefits discussed, equipment checked, pre-op evaluation, timeout performed, anesthesia consent given, oxygen available, monitors applied/VS acknowledged, fire risk safety assessment completed and verbalized and blood product R/B/A discussed and consented

## 2022-07-18 NOTE — ANESTHESIA POSTPROCEDURE EVALUATION
Department of Anesthesiology  Postprocedure Note    Patient: Virgilio Richard  MRN: [id number]  YOB: 1985  Date of evaluation: 7/18/2022      Procedure Summary     Date: 07/18/22 Room / Location:     Anesthesia Start: 0254 Anesthesia Stop: 5443    Procedure: Labor Analgesia Diagnosis:     Scheduled Providers:  Responsible Provider: ISABELL Yost CRNA    Anesthesia Type: epidural ASA Status: 2          Anesthesia Type: No value filed.     Raman Phase I:      Raman Phase II: Raman Score: 10      Anesthesia Post Evaluation    Patient location during evaluation: floor  Patient participation: complete - patient participated  Level of consciousness: awake  Pain score: 2  Airway patency: patent  Nausea & Vomiting: no nausea and no vomiting  Complications: no  Cardiovascular status: blood pressure returned to baseline  Respiratory status: acceptable  Hydration status: euvolemic

## 2022-07-18 NOTE — PROGRESS NOTES
Patient assisted up to bathroom for celine care. This RN with patient to provide steadying assistance. Returned to bed, magnesium infusion started with Babs BURROUGHS.

## 2022-07-18 NOTE — L&D DELIVERY NOTE
Mother's Information      Labor Events     Labor?: No  Cervical Ripening:   Now               Florentino Ort Pending [facility] [[id number]]      Labor Events     Labor?: No   Steroids?: None  Cervical Ripening Date/Time:     Cervical Ripening Type: Misoprostol  Antibiotics Received during Labor?: No  Rupture Date/Time:     Rupture Type: Intact, Bulging  Fluid Color: Bloody Show       Anesthesia           Start Pushing      Labor onset date/time:   Now     Dilation complete date/time:   Now     Start pushing date/time:    Decision date/time (emergent ):           Delivery ([place])      Delivery Date/Time:  22 05:25:00   Delivery Type: Vaginal, Spontaneous    Details:             Presentation    Presentation: Vertex       Shoulder Dystocia    Shoulder Dystocia Present?: No  Add Second Maneuver  Add Third Maneuver  Add Fourth Maneuver  Add Fifth Maneuver  Add Sixth Maneuver  Add Seventh Maneuver  Add Eighth Maneuver  Add Ninth Maneuver       Assisted Delivery Details    Forceps Attempted?: No  Vacuum Extractor Attempted?: No       Document Additional Attempt         Document Additional Attempt                 Cord    Complications: Nuchal Loose       Placenta           Lacerations           Vaginal Counts    Initial Count Personnel: Jennifer Stephenson RN  Initial Count Verified By: MUNIRA BURROUGHS    Sponges Needles Instruments   Initial Counts Correct Correct Correct   Final Counts Correct Correct Correct   Final Count Personnel: Jennifer Stephenson RN  Final Count Verified By: MUNIRA BURROUGHS  Accurate Final Count?: Yes  If the count is incorrect due to Intentionally Retained Foreign Object (IRFO) add the IRFO LDA in Lines/Drains.   Add LDA: Link to [facility]       Blood Loss  Mother: Toña Purchase #[id number]     Start of Mother's Information      Delivery Blood Loss  22 1725 - 22 0540      None                 End of Mother's Information  Mother: Toña Purchase #[id number] Delivery Providers    Delivering clinician:              [place] Assessment    Living Status: Living     Apgar Scoring Key:    0 1 2    Skin Color: Blue or pale Acrocyanotic Completely pink    Heart Rate: Absent <100 bpm >100 bpm    Reflex Irritability: No response Grimace Cry or active withdrawal    Muscle Tone: Limp Some flexion Active motion    Respiratory Effort: Absent Weak cry; hypoventilation Good, crying                      Skin Color:   Heart Rate:   Reflex Irritability:   Muscle Tone:   Respiratory Effort: Total:            1 Minute:        Apgar 1 total from OB History    5 Minute:        Apgar 5 total from OB History    10 Minute:              15 Minute:              20 Minute:                                 Resuscitation                Measurements               Title      Skin to Skin Initiation Date/Time:       Skin to Skin End Date/Time:                  Department of Obstetrics and Gynecology  Spontaneous Vaginal Delivery Note      Pre-operative Diagnosis:  Term pregnancy, Induced labor, and gestational hypertension    Post-operative Diagnosis:  Living  infant(s) and Male    Information for the patient's :  Abbey Del Valle Pending [facility] [[id number]]                  Infant Wt:   Information for the patient's :  [id number] [[id number]]           APGARS:     Information for the patient's :  Abbey Del Valle Pending [facility] [[id number]]           Anesthesia:  epidural anesthesia    Application and Delivery: Spontaneous vaginal delivery over an intact perineum. Nuchal cord x1 was encountered. Mom pushed well. Mouth and nose were suctioned with bulb suction. Cord was clamped and cut. Infant was handed off to the waiting nursery attendants. Cord blood specimen was obtained. Spontaneous delivery of an intact placenta and cord was performed. No lacerations were seen. Mom and baby were recovering well.     Delivery Summary:       Specimen: Placenta not sent to pathology     Estimated blood loss: 200             Condition:  infant stable to general nursery and mother stable    Blood Type and Rh: O POSITIVE    Attending Attestation: I performed the procedure.     Alejandrina Bañuelos MD 7/18/2022 5:40 AM

## 2022-07-18 NOTE — PROGRESS NOTES
During initial assessment with patient, patient c/o headache 5/10 and chest pain, constant aching and dull 5/10. Dr. Jerilyn Slaamanca at nurse's station and notified. Physician placing orders now. Tdap; 13-Oct-2014 04:40; Tho Morel (RN); [employer]; [id number]; IntraMuscular; Dorsogluteal Right.; 0.5 milliLiter(s);

## 2022-07-18 NOTE — PROGRESS NOTES
Subjective:     Postpartum Day 0: Vaginal delivery    Mariana reported chest pain earlier today at which time her BPs were in the severe range. Chest pain completely resolved. The patient feels well. The patient denies emotional concerns. Pain is well controlled with current medications. The baby is well. Objective:      Vitals:    07/18/22 0925   BP: 133/64   Pulse: 81   Resp: 18   Temp:    SpO2: 98%       General:    alert, appears stated age, and cooperative       Lochia:  appropriate   Uterine Fundus:   firm       DVT Evaluation:  No evidence of DVT seen on physical exam.     07/18/2022 0915 07/18/2022 0951 Brain Natriuretic Peptide [[id number]]   Blood    Component Value Units   Pro-BNP 9.74  PG/ML          07/18/2022 0915 07/18/2022 0921 Comprehensive Metabolic Panel [[id number]]   Blood    Component Value   No component results          07/18/2022 0915 07/18/2022 0942 CBC with Auto Differential [[id number]]   (Abnormal)   Blood    Component Value Units   WBC 8.9 K/CU MM   RBC 3.99 Low  M/CU MM   Hemoglobin 11.6 Low  GM/DL   Hematocrit 36.6 Low  %   MCV 91.7 FL   MCH 29.1 PG   MCHC 31.7 Low  %   RDW 14.6 %   Platelets 038 K/CU MM   MPV 9.9 FL   Differential Type AUTOMATED DIFFERENTIAL    Segs Relative 71.2 High  %   Lymphocytes % 22.0 Low  %   Monocytes % 5.3 High  %   Eosinophils % 1.0 %   Basophils % 0.2 %   Segs Absolute 6.3 K/CU MM   Lymphocytes Absolute 2.0 K/CU MM   Monocytes Absolute 0.5 K/CU MM   Eosinophils Absolute 0.1 K/CU MM   Basophils Absolute 0.0 K/CU MM   Nucleated RBC % 0.0 %   Total Nucleated RBC 0.0 K/CU MM   Total Immature Neutrophil 0.03 K/CU MM   Immature Neutrophil % 0.3 %          07/18/2022 0915 07/18/2022 0921 Troponin [[id number]]   Blood    Component Value   No component results            ECG prelim Normal sinus rhythm  Assessment:     1. Post partum day 0 . Doing well. 2. Chronic hypertension with severely elevated BPs and chest pain. Chest pain has resolved.  Bps improved with IV hydralzine        Plan:     Magnesium sulfate  Discontinue aldomet as no longer available in US   Begin labetalol  Monitor BP, urine out put.

## 2022-07-18 NOTE — ANESTHESIA PRE PROCEDURE
Department of Anesthesiology  Preprocedure Note       Name:  Tracey Joseph   Age:  44 y.o.  :  1985                                          MRN:  [id number]         Date:  2022      Surgeon: * No surgeons listed *    Procedure: * No procedures listed *    Medications prior to admission:   Prior to Admission medications    Medication Sig Start Date End Date Taking? Authorizing Provider   methyldopa (ALDOMET) 500 MG tablet Take 500 mg by mouth in the morning and 500 mg at noon and 500 mg before bedtime.     Historical Provider, MD       Current medications:    Current Facility-Administered Medications   Medication Dose Route Frequency Provider Last Rate Last Admin    lactated ringers infusion   IntraVENous Continuous Torrie Brandin, APRN -  mL/hr at 225 Rate Verify at 22    sodium chloride flush 0.9 % injection 5-40 mL  5-40 mL IntraVENous PRN Torrie Ghotra, APRN - CNM        0.9 % sodium chloride infusion  25 mL IntraVENous PRN Torrie Ghotra, APRN - CNM        oxytocin (PITOCIN) 30 units in 500 mL infusion  1-20 erika-units/min IntraVENous Continuous Torrie Houston, APRN - CNM 10 mL/hr at 22 10 erika-units/min at 22    oxytocin (PITOCIN) 30 units in 500 mL infusion  87.3 erika-units/min IntraVENous Continuous PRN Torrie Ghotra, APRN - CNM        And    oxytocin (PITOCIN) 10 unit bolus from the bag  10 Units IntraVENous PRN Torrie Ghotra, APRN - CNM        lidocaine PF 1 % injection 30 mL  30 mL Other PRN Torrie Ghotra APRN - CNM        fentaNYL (SUBLIMAZE) injection 100 mcg  100 mcg IntraVENous Q1H PRN Torrie Ghotra, APRN - CNM        famotidine (PEPCID) injection 20 mg  20 mg IntraVENous BID PRN Torrie Ghotra, APRN - CNM   20 mg at 22    ondansetron (ZOFRAN) injection 4 mg  4 mg IntraVENous Q6H PRN Torrie Ghotra, APRN - CNM           Allergies:  No Known Allergies    Problem List:    Patient Active Problem List   Diagnosis Code    Hypertension affecting pregnancy, antepartum O16.9    Elevated blood pressure affecting pregnancy in third trimester, antepartum O16.3       Past Medical History:        Diagnosis Date    Hypertension        Past Surgical History:        Procedure Laterality Date    DILATION AND CURETTAGE OF UTERUS      @ 8 yeras ago missed AB       Social History:    Social History     Tobacco Use    Smoking status: Never    Smokeless tobacco: Never   Substance Use Topics    Alcohol use: Not Currently                                Counseling given: Not Answered      Vital Signs (Current):   Vitals:    07/17/22 2309 07/18/22 0000 07/18/22 0100 07/18/22 0200   BP: 131/66 111/63 (!) 147/82 (!) 147/82   Pulse: 75 75 71 68   Resp:  16 18    Temp:  37.2 °C (99 °F) 36.7 °C (98.1 °F)    TempSrc:  Oral Oral    SpO2:   98%                                               BP Readings from Last 3 Encounters:   07/18/22 (!) 147/82   07/16/22 (!) 161/78       NPO Status:                                                                                 BMI:   Wt Readings from Last 3 Encounters:   07/16/22 255 lb (115.7 kg)     There is no height or weight on file to calculate BMI.    CBC:   Lab Results   Component Value Date/Time    WBC 7.8 07/17/2022 08:45 AM    RBC 3.75 07/17/2022 08:45 AM    HGB 10.9 07/17/2022 08:45 AM    HCT 35.3 07/17/2022 08:45 AM    MCV 94.1 07/17/2022 08:45 AM    RDW 14.9 07/17/2022 08:45 AM     07/17/2022 08:45 AM       CMP:   Lab Results   Component Value Date/Time     07/16/2022 06:05 PM    K 3.9 07/16/2022 06:05 PM     07/16/2022 06:05 PM    CO2 21 07/16/2022 06:05 PM    BUN 5 07/16/2022 06:05 PM    CREATININE 0.5 07/16/2022 06:05 PM    GFRAA >60 07/16/2022 06:05 PM    LABGLOM >60 07/16/2022 06:05 PM    GLUCOSE 119 07/16/2022 06:05 PM    PROT 6.4 07/16/2022 06:05 PM    CALCIUM 8.8 07/16/2022 06:05 PM    BILITOT 0.2 07/16/2022 06:05 PM    ALKPHOS 124 07/16/2022 06:05 PM    AST 10 07/16/2022 06:05 PM    ALT 9 07/16/2022 06:05 PM       POC Tests: No results for input(s): POCGLU, POCNA, POCK, POCCL, POCBUN, POCHEMO, POCHCT in the last 72 hours. Coags: No results found for: PROTIME, INR, APTT    HCG (If Applicable): No results found for: PREGTESTUR, PREGSERUM, HCG, HCGQUANT     ABGs: No results found for: PHART, PO2ART, FGG2YVA, GXN7WUD, BEART, P7IYLDOJ     Type & Screen (If Applicable):  No results found for: LABABO, LABRH    Drug/Infectious Status (If Applicable):  No results found for: HIV, HEPCAB    COVID-19 Screening (If Applicable): No results found for: COVID19        Anesthesia Evaluation  Patient summary reviewed and Nursing notes reviewed no history of anesthetic complications:   Airway: Mallampati: III  TM distance: <3 FB   Neck ROM: full  Mouth opening: > = 3 FB   Dental: normal exam         Pulmonary:normal exam                               Cardiovascular:  Exercise tolerance: good (>4 METS),   (+) hypertension:,                   Neuro/Psych:   Negative Neuro/Psych ROS              GI/Hepatic/Renal: Neg GI/Hepatic/Renal ROS            Endo/Other: Negative Endo/Other ROS                    Abdominal:   (+) obese,           Vascular: negative vascular ROS. Other Findings:           Anesthesia Plan      epidural     ASA 2             Anesthetic plan and risks discussed with patient. Use of blood products discussed with patient whom. Plan discussed with CRNA.           Post-op pain plan if not by surgeon: continuous epidural            ISABELL Rubalcava - CRNA   7/18/2022

## 2022-07-19 PROCEDURE — 6370000000 HC RX 637 (ALT 250 FOR IP): Performed by: OBSTETRICS & GYNECOLOGY

## 2022-07-19 PROCEDURE — 1220000000 HC SEMI PRIVATE OB R&B

## 2022-07-19 PROCEDURE — 2580000003 HC RX 258: Performed by: OBSTETRICS & GYNECOLOGY

## 2022-07-19 RX ADMIN — IBUPROFEN 800 MG: 800 TABLET, FILM COATED ORAL at 10:21

## 2022-07-19 RX ADMIN — LABETALOL HYDROCHLORIDE 200 MG: 200 TABLET, FILM COATED ORAL at 07:50

## 2022-07-19 RX ADMIN — SODIUM CHLORIDE, PRESERVATIVE FREE 10 ML: 5 INJECTION INTRAVENOUS at 10:23

## 2022-07-19 RX ADMIN — ACETAMINOPHEN 1000 MG: 500 TABLET ORAL at 17:02

## 2022-07-19 RX ADMIN — DOCUSATE SODIUM 100 MG: 100 CAPSULE, LIQUID FILLED ORAL at 20:47

## 2022-07-19 RX ADMIN — IBUPROFEN 800 MG: 800 TABLET, FILM COATED ORAL at 19:55

## 2022-07-19 RX ADMIN — LABETALOL HYDROCHLORIDE 200 MG: 200 TABLET, FILM COATED ORAL at 21:04

## 2022-07-19 RX ADMIN — FAMOTIDINE 20 MG: 20 TABLET ORAL at 19:55

## 2022-07-19 RX ADMIN — DOCUSATE SODIUM 100 MG: 100 CAPSULE, LIQUID FILLED ORAL at 07:49

## 2022-07-19 RX ADMIN — ACETAMINOPHEN 1000 MG: 500 TABLET ORAL at 06:57

## 2022-07-19 ASSESSMENT — PAIN DESCRIPTION - LOCATION
LOCATION: ABDOMEN
LOCATION: ABDOMEN
LOCATION: PERINEUM
LOCATION: PERINEUM
LOCATION: ABDOMEN

## 2022-07-19 ASSESSMENT — PAIN DESCRIPTION - ONSET
ONSET: GRADUAL

## 2022-07-19 ASSESSMENT — PAIN DESCRIPTION - ORIENTATION
ORIENTATION: LOWER

## 2022-07-19 ASSESSMENT — PAIN SCALES - GENERAL
PAINLEVEL_OUTOF10: 1
PAINLEVEL_OUTOF10: 0
PAINLEVEL_OUTOF10: 1
PAINLEVEL_OUTOF10: 0
PAINLEVEL_OUTOF10: 2
PAINLEVEL_OUTOF10: 0
PAINLEVEL_OUTOF10: 3
PAINLEVEL_OUTOF10: 3
PAINLEVEL_OUTOF10: 1

## 2022-07-19 ASSESSMENT — PAIN DESCRIPTION - FREQUENCY
FREQUENCY: INTERMITTENT

## 2022-07-19 ASSESSMENT — PAIN DESCRIPTION - DESCRIPTORS
DESCRIPTORS: DISCOMFORT

## 2022-07-19 ASSESSMENT — PAIN DESCRIPTION - PAIN TYPE
TYPE: ACUTE PAIN
TYPE: OTHER (COMMENT)
TYPE: ACUTE PAIN

## 2022-07-19 ASSESSMENT — PAIN - FUNCTIONAL ASSESSMENT
PAIN_FUNCTIONAL_ASSESSMENT: ACTIVITIES ARE NOT PREVENTED

## 2022-07-19 NOTE — FLOWSHEET NOTE
Sitting up in bed feeding denies c/o or needs at this time am meds given  Am assessment completed as charted.

## 2022-07-19 NOTE — FLOWSHEET NOTE
B/P 149/77   denies headache, blurred vision or epigastric discomfort pt requests to shower, shower chair given , bed linen changed, baby in room with this nurse.

## 2022-07-19 NOTE — PROGRESS NOTES
Subjective:     Postpartum Day 1:   The patient feels well. The patient denies emotional concerns. Pain is well controlled with current medications. The baby iswell. The patient is ambulating well. The patient is tolerating a normal diet. Objective:        Vitals:    07/19/22 1025   BP: (!) 149/77   Pulse: 75   Resp:    Temp:    SpO2:        Lab Results   Component Value Date    WBC 8.9 07/18/2022    HGB 11.6 (L) 07/18/2022    HCT 36.6 (L) 07/18/2022    MCV 91.7 07/18/2022     07/18/2022       General:    alert, appears stated age, and cooperative       Lochia:  appropriate   Uterine    firm       DVT Evaluation:  No evidence of DVT seen on physical exam.     Assessment:     Postpartum day 1 Doing well post delivery. Plan:     Continue current care.     Betsy Mullen MD 7/19/2022 1:33 PM

## 2022-07-19 NOTE — FLOWSHEET NOTE
Tele  advised of am B/P prior to medication given, advised of repeat B/P and serial B/P will continue to watch and keep informed advised pt denies headache, blurred vision or epigastric discomfort.

## 2022-07-19 NOTE — FLOWSHEET NOTE
Pt to mom baby 8 for continued care baby at side, POC discussed, oriented to room, instructed to order lunch tray, pt denies c/o or needs at this time baby at bedside in open crib. Call light within reach.

## 2022-07-20 VITALS
OXYGEN SATURATION: 98 % | SYSTOLIC BLOOD PRESSURE: 159 MMHG | TEMPERATURE: 98.3 F | DIASTOLIC BLOOD PRESSURE: 88 MMHG | RESPIRATION RATE: 20 BRPM | HEART RATE: 78 BPM

## 2022-07-20 PROCEDURE — 6370000000 HC RX 637 (ALT 250 FOR IP): Performed by: OBSTETRICS & GYNECOLOGY

## 2022-07-20 RX ORDER — IBUPROFEN 800 MG/1
800 TABLET ORAL EVERY 8 HOURS PRN
Qty: 60 TABLET | Refills: 2 | Status: SHIPPED | OUTPATIENT
Start: 2022-07-20

## 2022-07-20 RX ORDER — LABETALOL 200 MG/1
200 TABLET, FILM COATED ORAL EVERY 12 HOURS SCHEDULED
Qty: 60 TABLET | Refills: 3 | Status: ON HOLD | OUTPATIENT
Start: 2022-07-20 | End: 2022-08-03 | Stop reason: HOSPADM

## 2022-07-20 RX ADMIN — ACETAMINOPHEN 1000 MG: 500 TABLET ORAL at 09:55

## 2022-07-20 RX ADMIN — LABETALOL HYDROCHLORIDE 200 MG: 200 TABLET, FILM COATED ORAL at 09:56

## 2022-07-20 RX ADMIN — ACETAMINOPHEN 1000 MG: 500 TABLET ORAL at 01:34

## 2022-07-20 RX ADMIN — IBUPROFEN 800 MG: 800 TABLET, FILM COATED ORAL at 04:09

## 2022-07-20 RX ADMIN — DOCUSATE SODIUM 100 MG: 100 CAPSULE, LIQUID FILLED ORAL at 09:56

## 2022-07-20 RX ADMIN — IBUPROFEN 800 MG: 800 TABLET, FILM COATED ORAL at 15:36

## 2022-07-20 ASSESSMENT — PAIN DESCRIPTION - LOCATION
LOCATION: ABDOMEN

## 2022-07-20 ASSESSMENT — PAIN - FUNCTIONAL ASSESSMENT
PAIN_FUNCTIONAL_ASSESSMENT: ACTIVITIES ARE NOT PREVENTED

## 2022-07-20 ASSESSMENT — PAIN DESCRIPTION - ORIENTATION
ORIENTATION: LOWER
ORIENTATION: LOWER;MID

## 2022-07-20 ASSESSMENT — PAIN SCALES - GENERAL
PAINLEVEL_OUTOF10: 3
PAINLEVEL_OUTOF10: 1
PAINLEVEL_OUTOF10: 1
PAINLEVEL_OUTOF10: 3
PAINLEVEL_OUTOF10: 3
PAINLEVEL_OUTOF10: 1

## 2022-07-20 ASSESSMENT — PAIN DESCRIPTION - PAIN TYPE
TYPE: ACUTE PAIN

## 2022-07-20 ASSESSMENT — PAIN DESCRIPTION - ONSET
ONSET: GRADUAL
ONSET: ON-GOING
ONSET: ON-GOING
ONSET: GRADUAL
ONSET: GRADUAL

## 2022-07-20 ASSESSMENT — PAIN DESCRIPTION - DESCRIPTORS
DESCRIPTORS: DISCOMFORT;CRAMPING
DESCRIPTORS: DISCOMFORT

## 2022-07-20 ASSESSMENT — PAIN DESCRIPTION - FREQUENCY
FREQUENCY: INTERMITTENT

## 2022-07-20 NOTE — CARE COORDINATION
Received call from Robbyrimike Parrish, [place]. Patient has no insurance and needs help with BP medication at discharge. Advise to use GoodRx for refills as she can get medication at [facility] for $4.86. Agreed to help.   1x voucher to OP Pharmacy and placed patient on flagged list. DISPLAY PLAN FREE TEXT

## 2022-07-20 NOTE — DISCHARGE SUMMARY
Obstetrical Discharge Form    Gestational Age:  44w3d    Antepartum complications: pregnancy induced hypertension    Date of Delivery:    22    Type of Delivery:   vaginal, spontaneous    Delivered By:                 Magda Morgan:       Information for the patient's :  Zita Zapataer Ramesh Lancaster [[id number]]      Anesthesia:    Epidural    Intrapartum complications: None    Postpartum complications: preeclampsia treated with magneisum sulfate    Vitals:    22 0409   BP: (!) 148/82   Pulse: 70   Resp: 20   Temp: 98.7 °F (37.1 °C)   SpO2: 97%     Lab Results   Component Value Date    WBC 8.9 2022    HGB 11.6 (L) 2022    HCT 36.6 (L) 2022    MCV 91.7 2022     2022       Condition at discharge: Good/stable      Discharge Date:       Plan:   Follow up in 1-2 weeks.

## 2022-07-20 NOTE — DISCHARGE INSTRUCTIONS
Discharge Instructions    Thank you for letting us care for you and your family. The following are  discharge instructions for yourself and your baby. If you have any questions once you have arrived home please feel free to contact the [facility] at [phone]. MOM INSTRUCTIONS    DIET    Eat a well balanced diet focusing on foods high in fiber and protein. Drink plenty of fluids (  8 to 10 glasses a day) especially water. To avoid constipation you may take a mild stool softener as recommended by your doctor or midwife. ACTIVITY    Gradually increase your activity. Resume your exercise regimen only after advised by you doctor or midwife. Avoid lifting anything heavier than your baby for 6 weeks or until otherwise advised by your doctor or midwife. Avoid driving for 2 weeks or if taking narcotics. Climb stairs one at a time. Use caution when carrying your baby up and down the stairs. NO SEXUAL ACTIVITY and nothing in your vagina( tampons or douching) for 4 to 6 weeks or until advised by your doctor or midwife. Be prepared to discuss family planning at your follow-up OB visit. You may feel tired or have a lack of energy. Nap when the baby naps to catch up on your sleep. You may continue to take prenatal vitamin to replenish nutrients post delivery as directed by your doctor or midwife. EMOTIONS    You may feel sad, teary, overwhelmed and reaves. Contact your OB provider if symptoms worsen or last more than 2 weeks. Contact your OB provider if you have thoughts of harming yourself or your baby. If your baby will not stop crying and you are feeling overwhelmed, place your baby in a crib on their back and contact another adult for help. NEVER SHAKE A BABY. BLEEDING    Your vaginal bleeding will decrease in amount over the next 2 to 6 weeks. You will notice that as your activity increases your flow may increase.  This is your body's way of telling you to take it easy and rest more.  If you saturate more than one maxi pad in an hour or you pass a clot larger than a lemon and resting does not help the flow slow down , call your OB doctor or midwife. If your bleeding has a foul odor call you doctor or midwife. BREAST CARE    For breastfeeding moms:  Refer to your breastfeeding booklet provided by the hospital if you have any questions. If you become engorged,feeding may be more difficult or painful for 1 to 2 days. You may find it helpful to express some milk before feeding so that the infant can latch on more easily. Continue to take your prenatal vitamins as directed by your doctor or midwife while you are breastfeeding. For any questions or concerns, contact our Lactation Consultant at [phone]. For non-breastfeeding moms:  You may apply ice packs to your breasts over your bra for twenty minutes at a time for comfort. Avoid stimulation to your breasts. When showering allow the water to strike your back not your breasts. Do not express your milk. Wear a good fitting bra. INCISIONAL CARE    Clean your incision in the shower with mild soap. After your shower pat the incision dry and keep the area open to the air. If used, steri-strips should be removed by 2 weeks. If used,staples should be removed by the OB's office in 1 week. If ordered, an abdominal binder may provide support for your incision. Have some one check your incision ever day for swelling,bleeding,drainage,foul odor,redness and that the edges are approximated. If your incision has any of the above,notify you doctor or midwife. PERINEAL CARE    Use the celine-bottle every time after you use the toilet. Cleanse your perineum from front to back. If you had stitches in your perineum,they will dissolve in 4 to 6 weeks. You may use a sitz bath and Tucks pads as directed and as needed for comfort. SWELLING    Try to keep your legs elevated when you are sitting.   When lying down keep your legs elevated. When wearing stockings or socks,make sure they are not too tight. WHEN TO CALL THE DOCTOR    If you have a temperature of 100.6 degrees or higher. If your bleeding has increased and your are soaking a maxi-pad in an hour. If your abdomen is tender to touch. If you are passing blood clots bigger than the size of a lemon. If you are experiencing extreme weakness or dizziness. If you have are having flu-like symptoms such as achy joints and muscles. If there is a foul smell or a green color to your vaginal bleeding. If you have pain that can not be relieved. If you have persistent burning with urination or frequent urination. If you have concerns about your well-being. If you have a warm,firm, red lump in your breast.  If you are unable to sleep,eat, or are having thoughts of harming yourself or the baby. If you have a red,warm, tender area in your calf. If you have swelling, bleeding, drainage,foul odor,redness or warmth in or around your incision or stitches. PAMPHLETS GIVEN TO PARENTS    W. I.C.   Good Nutrition for Women, Infants and Children  : Sounds of Pertussis to help protect the health and wellness of adults and infants. [address] [facility]: [address] [facility]: [place]  hearing Screening  [facility]: [place], [place] [facility] Postpartum Depression Hannah Jordan [address] [facility]: All kids need Hepatitis B shots! Back to sleep handout  Shaken baby handout        I verify that I have received the above information,that I have reviewed it and that I have no further questions. [employer] has provided me with the opportunity to view instructional videos pertaining to care of myself and my baby. I will share this information with all caregivers for my child(sheela). I feel confident to care for myself and my baby.     Thank you for the opportunity to care for you and your easy and rest more. If you saturate more than one maxi pad in an hour or you pass a clot larger than a lemon and resting does not help the flow slow down , call your OB doctor or midwife. If your bleeding has a foul odor call you doctor or midwife. BREAST CARE    For breastfeeding moms:  Refer to your breastfeeding booklet provided by the hospital if you have any questions. If you become engorged,feeding may be more difficult or painful for 1 to 2 days. You may find it helpful to express some milk before feeding so that the infant can latch on more easily. Continue to take your prenatal vitamins as directed by your doctor or midwife while you are breastfeeding. For any questions or concerns, contact our Lactation Consultant at [phone]. For non-breastfeeding moms:  You may apply ice packs to your breasts over your bra for twenty minutes at a time for comfort. Avoid stimulation to your breasts. When showering allow the water to strike your back not your breasts. Do not express your milk. Wear a good fitting bra. INCISIONAL CARE    Clean your incision in the shower with mild soap. After your shower pat the incision dry and keep the area open to the air. If used, steri-strips should be removed by 2 weeks. If used,staples should be removed by the OB's office in 1 week. If ordered, an abdominal binder may provide support for your incision. Have some one check your incision ever day for swelling,bleeding,drainage,foul odor,redness and that the edges are approximated. If your incision has any of the above,notify you doctor or midwife. PERINEAL CARE    Use the celine-bottle every time after you use the toilet. Cleanse your perineum from front to back. If you had stitches in your perineum,they will dissolve in 4 to 6 weeks. You may use a sitz bath and Tucks pads as directed and as needed for comfort. SWELLING    Try to keep your legs elevated when you are sitting.   When lying down keep your legs elevated. When wearing stockings or socks,make sure they are not too tight. WHEN TO CALL THE DOCTOR    If you have a temperature of 100.6 degrees or higher. If your bleeding has increased and your are soaking a maxi-pad in an hour. If your abdomen is tender to touch. If you are passing blood clots bigger than the size of a lemon. If you are experiencing extreme weakness or dizziness. If you have are having flu-like symptoms such as achy joints and muscles. If there is a foul smell or a green color to your vaginal bleeding. If you have pain that can not be relieved. If you have persistent burning with urination or frequent urination. If you have concerns about your well-being. If you have a warm,firm, red lump in your breast.  If you are unable to sleep,eat, or are having thoughts of harming yourself or the baby. If you have a red,warm, tender area in your calf. If you have swelling, bleeding, drainage,foul odor,redness or warmth in or around your incision or stitches. PAMPHLETS GIVEN TO PARENTS    W. I.C.   Good Nutrition for Women, Infants and Children  : Sounds of Pertussis to help protect the health and wellness of adults and infants. [address] [facility]: [address] [facility]: [place]  hearing Screening  [facility]: [place], [place] [facility] Postpartum Depression Hannah De Christi Barajas [address] [facility]: All kids need Hepatitis B shots! Back to sleep handout  Shaken baby handout        I verify that I have received the above information,that I have reviewed it and that I have no further questions. [employer] has provided me with the opportunity to view instructional videos pertaining to care of myself and my baby. I will share this information with all caregivers for my child(sheela). I feel confident to care for myself and my baby.     Thank you for the opportunity to care for you and your family!

## 2022-07-20 NOTE — CARE COORDINATION
YADIW was asked to speak with this pt due to issues pt is having with FOB. Pt is a citizen of [place]. Pt speak English very well. Pt is concerned that the FOB may be able to take custody aware from her if she decides to go back to [place]. LSW explained to pt that in the state of [place] she old full custody of the baby since they are not . FOB will have to go in front of a  and deem her a unfit parent to be able to take custody away from her. Pt stated that the FOB is  and his wife lives in [place] and he has another girlfriend she just found out about. Pt stated that the FOB has provided for the baby. Pt stated that FOB is not abusive and she feels safe living in her home. Pt stated she has family in [place] and is considering going to [place] to be with family. Pt denies needing any material items for baby and she plans to go to [address] [facility]. CM is available if needed.

## 2022-07-20 NOTE — PLAN OF CARE
Problem: Vaginal Birth or  Section  Goal: Fetal and maternal status remain reassuring during the birth process  Description:  Birth OB-Pregnancy care plan goal which identifies if the fetal and maternal status remain reassuring during the birth process  20224 by Edward Morgan RN  Outcome: Completed

## 2022-07-20 NOTE — PLAN OF CARE
Problem: Vaginal Birth or  Section  Goal: Fetal and maternal status remain reassuring during the birth process  Description:  Birth OB-Pregnancy care plan goal which identifies if the fetal and maternal status remain reassuring during the birth process  20224 by Guido Hawley RN  Outcome: Completed

## 2022-08-01 ENCOUNTER — HOSPITAL ENCOUNTER (INPATIENT)
Age: 37
LOS: 2 days | Discharge: HOME | DRG: 561 | End: 2022-08-03
Attending: OBSTETRICS & GYNECOLOGY | Admitting: OBSTETRICS & GYNECOLOGY
Payer: MEDICAID

## 2022-08-01 LAB
ALBUMIN SERPL-MCNC: 4.1 GM/DL (ref 3.4–5)
ALP BLD-CCNC: 111 IU/L (ref 40–129)
ALT SERPL-CCNC: 21 U/L (ref 10–40)
ANION GAP SERPL CALCULATED.3IONS-SCNC: 11 MMOL/L (ref 4–16)
AST SERPL-CCNC: 19 IU/L (ref 15–37)
BACTERIA: NEGATIVE /HPF
BASOPHILS ABSOLUTE: 0 K/CU MM
BASOPHILS RELATIVE PERCENT: 0.4 % (ref 0–1)
BILIRUB SERPL-MCNC: 0.2 MG/DL (ref 0–1)
BILIRUBIN URINE: NEGATIVE MG/DL
BLOOD, URINE: ABNORMAL
BUN BLDV-MCNC: 18 MG/DL (ref 6–23)
CALCIUM SERPL-MCNC: 9.1 MG/DL (ref 8.3–10.6)
CHLORIDE BLD-SCNC: 105 MMOL/L (ref 99–110)
CLARITY: ABNORMAL
CO2: 23 MMOL/L (ref 21–32)
COLOR: YELLOW
CREAT SERPL-MCNC: 0.6 MG/DL (ref 0.6–1.1)
CREATININE URINE: 230.5 MG/DL (ref 28–217)
DIFFERENTIAL TYPE: ABNORMAL
EOSINOPHILS ABSOLUTE: 0.2 K/CU MM
EOSINOPHILS RELATIVE PERCENT: 3.4 % (ref 0–3)
GFR AFRICAN AMERICAN: >60 ML/MIN/1.73M2
GFR NON-AFRICAN AMERICAN: >60 ML/MIN/1.73M2
GLUCOSE BLD-MCNC: 87 MG/DL (ref 70–99)
GLUCOSE, URINE: NEGATIVE MG/DL
HCT VFR BLD CALC: 51.4 % (ref 37–47)
HEMOGLOBIN: 15.8 GM/DL (ref 12.5–16)
IMMATURE NEUTROPHIL %: 0.2 % (ref 0–0.43)
KETONES, URINE: ABNORMAL MG/DL
LACTATE DEHYDROGENASE: 278 IU/L (ref 120–246)
LEUKOCYTE ESTERASE, URINE: ABNORMAL
LYMPHOCYTES ABSOLUTE: 2 K/CU MM
LYMPHOCYTES RELATIVE PERCENT: 37.7 % (ref 24–44)
MCH RBC QN AUTO: 28.8 PG (ref 27–31)
MCHC RBC AUTO-ENTMCNC: 30.7 % (ref 32–36)
MCV RBC AUTO: 93.8 FL (ref 78–100)
MONOCYTES ABSOLUTE: 0.2 K/CU MM
MONOCYTES RELATIVE PERCENT: 4.4 % (ref 0–4)
MUCUS: ABNORMAL HPF
NITRITE URINE, QUANTITATIVE: NEGATIVE
NUCLEATED RBC %: 0 %
PDW BLD-RTO: 14.1 % (ref 11.7–14.9)
PH, URINE: 5.5 (ref 5–8)
PLATELET # BLD: 211 K/CU MM (ref 140–440)
PMV BLD AUTO: 10.7 FL (ref 7.5–11.1)
POTASSIUM SERPL-SCNC: 4.4 MMOL/L (ref 3.5–5.1)
PROT/CREAT RATIO, UR: 0.2
PROTEIN UA: 30 MG/DL
RBC # BLD: 5.48 M/CU MM (ref 4.2–5.4)
RBC URINE: 15 /HPF (ref 0–6)
SEGMENTED NEUTROPHILS ABSOLUTE COUNT: 2.9 K/CU MM
SEGMENTED NEUTROPHILS RELATIVE PERCENT: 53.9 % (ref 36–66)
SODIUM BLD-SCNC: 139 MMOL/L (ref 135–145)
SPECIFIC GRAVITY UA: >1.03 (ref 1–1.03)
SQUAMOUS EPITHELIAL: 20 /HPF
TOTAL IMMATURE NEUTOROPHIL: 0.01 K/CU MM
TOTAL NUCLEATED RBC: 0 K/CU MM
TOTAL PROTEIN: 7.4 GM/DL (ref 6.4–8.2)
TRICHOMONAS: ABNORMAL /HPF
URIC ACID: 5.7 MG/DL (ref 2.6–6)
URINE TOTAL PROTEIN: 50.6 MG/DL
UROBILINOGEN, URINE: 1 MG/DL (ref 0.2–1)
WBC # BLD: 5.3 K/CU MM (ref 4–10.5)
WBC UA: 53 /HPF (ref 0–5)

## 2022-08-01 PROCEDURE — 84156 ASSAY OF PROTEIN URINE: CPT

## 2022-08-01 PROCEDURE — 81001 URINALYSIS AUTO W/SCOPE: CPT

## 2022-08-01 PROCEDURE — 82570 ASSAY OF URINE CREATININE: CPT

## 2022-08-01 PROCEDURE — 2500000003 HC RX 250 WO HCPCS: Performed by: ADVANCED PRACTICE MIDWIFE

## 2022-08-01 PROCEDURE — 1220000000 HC SEMI PRIVATE OB R&B

## 2022-08-01 PROCEDURE — 84550 ASSAY OF BLOOD/URIC ACID: CPT

## 2022-08-01 PROCEDURE — 83615 LACTATE (LD) (LDH) ENZYME: CPT

## 2022-08-01 PROCEDURE — 6360000002 HC RX W HCPCS: Performed by: ADVANCED PRACTICE MIDWIFE

## 2022-08-01 PROCEDURE — 6370000000 HC RX 637 (ALT 250 FOR IP): Performed by: ADVANCED PRACTICE MIDWIFE

## 2022-08-01 PROCEDURE — 80053 COMPREHEN METABOLIC PANEL: CPT

## 2022-08-01 PROCEDURE — 2580000003 HC RX 258: Performed by: OBSTETRICS & GYNECOLOGY

## 2022-08-01 PROCEDURE — 85025 COMPLETE CBC W/AUTO DIFF WBC: CPT

## 2022-08-01 RX ORDER — MAGNESIUM SULFATE 4 G/50ML
4000 INJECTION INTRAVENOUS ONCE
Status: COMPLETED | OUTPATIENT
Start: 2022-08-01 | End: 2022-08-01

## 2022-08-01 RX ORDER — ACETAMINOPHEN 325 MG/1
650 TABLET ORAL EVERY 4 HOURS PRN
Status: DISCONTINUED | OUTPATIENT
Start: 2022-08-01 | End: 2022-08-03 | Stop reason: HOSPADM

## 2022-08-01 RX ORDER — SODIUM CHLORIDE 9 MG/ML
INJECTION, SOLUTION INTRAVENOUS PRN
Status: DISCONTINUED | OUTPATIENT
Start: 2022-08-01 | End: 2022-08-03 | Stop reason: HOSPADM

## 2022-08-01 RX ORDER — SODIUM CHLORIDE, SODIUM LACTATE, POTASSIUM CHLORIDE, CALCIUM CHLORIDE 600; 310; 30; 20 MG/100ML; MG/100ML; MG/100ML; MG/100ML
INJECTION, SOLUTION INTRAVENOUS CONTINUOUS
Status: DISCONTINUED | OUTPATIENT
Start: 2022-08-01 | End: 2022-08-03

## 2022-08-01 RX ORDER — CALCIUM GLUCONATE 94 MG/ML
1000 INJECTION, SOLUTION INTRAVENOUS PRN
Status: DISCONTINUED | OUTPATIENT
Start: 2022-08-01 | End: 2022-08-03 | Stop reason: HOSPADM

## 2022-08-01 RX ORDER — LABETALOL HYDROCHLORIDE 5 MG/ML
40 INJECTION, SOLUTION INTRAVENOUS
Status: COMPLETED | OUTPATIENT
Start: 2022-08-01 | End: 2022-08-01

## 2022-08-01 RX ORDER — LABETALOL HYDROCHLORIDE 5 MG/ML
20 INJECTION, SOLUTION INTRAVENOUS
Status: COMPLETED | OUTPATIENT
Start: 2022-08-01 | End: 2022-08-01

## 2022-08-01 RX ORDER — LABETALOL 200 MG/1
200 TABLET, FILM COATED ORAL EVERY 12 HOURS SCHEDULED
Status: DISCONTINUED | OUTPATIENT
Start: 2022-08-01 | End: 2022-08-02

## 2022-08-01 RX ORDER — HYDRALAZINE HYDROCHLORIDE 20 MG/ML
10 INJECTION INTRAMUSCULAR; INTRAVENOUS
Status: ACTIVE | OUTPATIENT
Start: 2022-08-01 | End: 2022-08-01

## 2022-08-01 RX ORDER — SODIUM CHLORIDE 0.9 % (FLUSH) 0.9 %
5-40 SYRINGE (ML) INJECTION PRN
Status: DISCONTINUED | OUTPATIENT
Start: 2022-08-01 | End: 2022-08-03 | Stop reason: HOSPADM

## 2022-08-01 RX ORDER — SODIUM CHLORIDE 0.9 % (FLUSH) 0.9 %
5-40 SYRINGE (ML) INJECTION EVERY 12 HOURS SCHEDULED
Status: DISCONTINUED | OUTPATIENT
Start: 2022-08-01 | End: 2022-08-03 | Stop reason: HOSPADM

## 2022-08-01 RX ORDER — LABETALOL HYDROCHLORIDE 5 MG/ML
80 INJECTION, SOLUTION INTRAVENOUS
Status: COMPLETED | OUTPATIENT
Start: 2022-08-01 | End: 2022-08-01

## 2022-08-01 RX ADMIN — LABETALOL HYDROCHLORIDE 40 MG: 5 INJECTION, SOLUTION INTRAVENOUS at 19:34

## 2022-08-01 RX ADMIN — LABETALOL HYDROCHLORIDE 80 MG: 5 INJECTION, SOLUTION INTRAVENOUS at 19:51

## 2022-08-01 RX ADMIN — MAGNESIUM SULFATE HEPTAHYDRATE 2000 MG/HR: 40 INJECTION, SOLUTION INTRAVENOUS at 19:31

## 2022-08-01 RX ADMIN — LABETALOL HYDROCHLORIDE 200 MG: 200 TABLET, FILM COATED ORAL at 22:00

## 2022-08-01 RX ADMIN — SODIUM CHLORIDE, POTASSIUM CHLORIDE, SODIUM LACTATE AND CALCIUM CHLORIDE: 600; 310; 30; 20 INJECTION, SOLUTION INTRAVENOUS at 19:15

## 2022-08-01 RX ADMIN — LABETALOL HYDROCHLORIDE 20 MG: 5 INJECTION, SOLUTION INTRAVENOUS at 19:15

## 2022-08-01 RX ADMIN — MAGNESIUM SULFATE HEPTAHYDRATE 4000 MG: 4 INJECTION, SOLUTION INTRAVENOUS at 19:14

## 2022-08-01 ASSESSMENT — PAIN DESCRIPTION - ORIENTATION
ORIENTATION: ANTERIOR;MID
ORIENTATION: ANTERIOR;MID

## 2022-08-01 ASSESSMENT — PAIN DESCRIPTION - FREQUENCY
FREQUENCY: CONTINUOUS
FREQUENCY: CONTINUOUS

## 2022-08-01 ASSESSMENT — PAIN DESCRIPTION - PAIN TYPE
TYPE: ACUTE PAIN
TYPE: ACUTE PAIN

## 2022-08-01 ASSESSMENT — PAIN DESCRIPTION - DESCRIPTORS
DESCRIPTORS: ACHING
DESCRIPTORS: ACHING

## 2022-08-01 ASSESSMENT — PAIN DESCRIPTION - LOCATION
LOCATION: CHEST
LOCATION: CHEST

## 2022-08-01 ASSESSMENT — PAIN SCALES - GENERAL
PAINLEVEL_OUTOF10: 2
PAINLEVEL_OUTOF10: 2

## 2022-08-01 ASSESSMENT — PAIN - FUNCTIONAL ASSESSMENT
PAIN_FUNCTIONAL_ASSESSMENT: ACTIVITIES ARE NOT PREVENTED
PAIN_FUNCTIONAL_ASSESSMENT: ACTIVITIES ARE NOT PREVENTED

## 2022-08-01 ASSESSMENT — PAIN DESCRIPTION - ONSET
ONSET: ON-GOING
ONSET: ON-GOING

## 2022-08-01 NOTE — PROGRESS NOTES
Patient arrived to unit from [address] [facility] with complaints of elevated BP. BP elevated on arrival (see flowsheet) and chest pain, and blurred vision.  Goyo Tomlin CNM notified of arrival, along with Dr. Liana Colin.

## 2022-08-01 NOTE — H&P
Department of Obstetrics and Gynecology   Obstetrics History and Physical        CHIEF COMPLAINT:   Chief Complaint   Patient presents with    Hypertension         HISTORY OF PRESENT ILLNESS:      Jennifer Urbina is a 43 yo  who had an  on . She was induced for a history of cHTN and developed preeclampsia on PPD0. She was treated with MgSO4 postpartum. She presented to the [facility] today with epigastric pain, chest pain, head ache, blurred vision and severe range pressures 170s/100s. Upon arrival to triage she continues to have severe range Bps, blurred vision and mild epigastric pain. She denies HA at this time. OB History          3    Para   3    Term   3            AB        Living   3         SAB        IAB        Ectopic        Molar        Multiple   0    Live Births   3            Patient presents with a chief complaint as above and is being admitted for pre-eclampsia postpartum. Estimated Due Date: Estimated Date of Delivery: 22    PRENATAL CARE:    Complicated by: chronic hypertension    PAST OB HISTORY  OB History          3    Para   3    Term   3            AB        Living   3         SAB        IAB        Ectopic        Molar        Multiple   0    Live Births   3                Past Medical History:        Diagnosis Date    Hypertension      Past Surgical History:        Procedure Laterality Date    DILATION AND CURETTAGE OF UTERUS      @ 8 yeras ago missed AB     Allergies:  Patient has no known allergies.   Social History:    Social History     Socioeconomic History    Marital status: Single     Spouse name: Not on file    Number of children: Not on file    Years of education: Not on file    Highest education level: Not on file   Occupational History    Not on file   Tobacco Use    Smoking status: Never    Smokeless tobacco: Never   Substance and Sexual Activity    Alcohol use: Not Currently    Drug use: Never    Sexual activity: Yes     Partners: Male Other Topics Concern    Not on file   Social History Narrative    Not on file     Social Determinants of Health     Financial Resource Strain: Not on file   Food Insecurity: Not on file   Transportation Needs: Not on file   Physical Activity: Not on file   Stress: Not on file   Social Connections: Not on file   Intimate Partner Violence: Not on file   Housing Stability: Not on file     Family History:       Problem Relation Age of Onset    Diabetes Mother      Medications Prior to Admission:  Medications Prior to Admission: labetalol (NORMODYNE) 200 MG tablet, Take 1 tablet by mouth in the morning and 1 tablet before bedtime. ibuprofen (ADVIL;MOTRIN) 800 MG tablet, Take 1 tablet by mouth every 8 hours as needed for Pain    REVIEW OF SYSTEMS:    CONSTITUTIONAL:  negative  RESPIRATORY:  some mild chest pain  CARDIOVASCULAR:  negative  GASTROINTESTINAL:  mild epigastric pain  ALLERGIC/IMMUNOLOGIC:  negative  GI/: negative  NEUROLOGICAL:  denies HA, + blurred vision  BEHAVIOR/PSYCH:  negative    PHYSICAL EXAM:  Blood pressure (!) 214/98, pulse 70, temperature 99.8 °F (37.7 °C), temperature source Oral, resp. rate 20, SpO2 98 %, unknown if currently breastfeeding. Lab Results   Component Value Date    WBC 8.9 2022    HGB 11.6 (L) 2022    HCT 36.6 (L) 2022    MCV 91.7 2022     2022       Blood Type O+      General appearance:  awake, alert, cooperative, no apparent distress, and appears stated age  Neurologic:  Awake, alert, oriented to name, place and time. Lungs:  CTAB, no SOB and dyspnea   Abdomen:  Soft, non tender, appropriate pp involution, no R upper quadrant pain to palpation  Extremeties: no erythema, swelling or clonus bilaterally. LE DTRs +2. ASSESSMENT:    44 y.o. s/p  on   Chronic HTN with postpartum preeclampsia; on 200mg labetalol BID at home  Unstable, severe range BPs    PLAN:    Admit to L/D for preeclampsia management.    Preeclampsia labs ordered and pending  Postpartum MgSO4 4g bolus and 2g maintenance per Dr. Kandy Martini  Per Dr. Kandy Martini to treat with IV labetalol protocol and also order the PO labetalol she has been taking outpatient. Will update Dr. Marisol Perez, the hospitalist, prior to leaving this evening.          ISABELL Mena - [place]

## 2022-08-02 PROCEDURE — 6370000000 HC RX 637 (ALT 250 FOR IP)

## 2022-08-02 PROCEDURE — 6370000000 HC RX 637 (ALT 250 FOR IP): Performed by: ADVANCED PRACTICE MIDWIFE

## 2022-08-02 PROCEDURE — 6360000002 HC RX W HCPCS: Performed by: ADVANCED PRACTICE MIDWIFE

## 2022-08-02 PROCEDURE — 2580000003 HC RX 258: Performed by: OBSTETRICS & GYNECOLOGY

## 2022-08-02 PROCEDURE — 6370000000 HC RX 637 (ALT 250 FOR IP): Performed by: INTERNAL MEDICINE

## 2022-08-02 PROCEDURE — 6360000002 HC RX W HCPCS: Performed by: INTERNAL MEDICINE

## 2022-08-02 PROCEDURE — 1220000000 HC SEMI PRIVATE OB R&B

## 2022-08-02 RX ORDER — NIFEDIPINE 30 MG/1
TABLET, EXTENDED RELEASE ORAL
Status: COMPLETED
Start: 2022-08-02 | End: 2022-08-02

## 2022-08-02 RX ORDER — CLONIDINE HYDROCHLORIDE 0.1 MG/1
0.1 TABLET ORAL ONCE
Status: COMPLETED | OUTPATIENT
Start: 2022-08-02 | End: 2022-08-02

## 2022-08-02 RX ORDER — NIFEDIPINE 30 MG/1
60 TABLET, EXTENDED RELEASE ORAL DAILY
Status: DISCONTINUED | OUTPATIENT
Start: 2022-08-02 | End: 2022-08-03 | Stop reason: HOSPADM

## 2022-08-02 RX ORDER — CITALOPRAM 20 MG/1
10 TABLET ORAL DAILY
Status: DISCONTINUED | OUTPATIENT
Start: 2022-08-02 | End: 2022-08-03 | Stop reason: HOSPADM

## 2022-08-02 RX ORDER — LABETALOL 200 MG/1
400 TABLET, FILM COATED ORAL EVERY 12 HOURS SCHEDULED
Status: DISCONTINUED | OUTPATIENT
Start: 2022-08-02 | End: 2022-08-03 | Stop reason: HOSPADM

## 2022-08-02 RX ORDER — HYDRALAZINE HYDROCHLORIDE 20 MG/ML
10 INJECTION INTRAMUSCULAR; INTRAVENOUS EVERY 8 HOURS PRN
Status: DISCONTINUED | OUTPATIENT
Start: 2022-08-02 | End: 2022-08-03 | Stop reason: HOSPADM

## 2022-08-02 RX ADMIN — SODIUM CHLORIDE, POTASSIUM CHLORIDE, SODIUM LACTATE AND CALCIUM CHLORIDE: 600; 310; 30; 20 INJECTION, SOLUTION INTRAVENOUS at 17:39

## 2022-08-02 RX ADMIN — NIFEDIPINE 60 MG: 30 TABLET, FILM COATED, EXTENDED RELEASE ORAL at 10:17

## 2022-08-02 RX ADMIN — LABETALOL HYDROCHLORIDE 400 MG: 200 TABLET, FILM COATED ORAL at 21:48

## 2022-08-02 RX ADMIN — ACETAMINOPHEN 650 MG: 325 TABLET ORAL at 09:18

## 2022-08-02 RX ADMIN — SODIUM CHLORIDE, POTASSIUM CHLORIDE, SODIUM LACTATE AND CALCIUM CHLORIDE: 600; 310; 30; 20 INJECTION, SOLUTION INTRAVENOUS at 05:08

## 2022-08-02 RX ADMIN — MAGNESIUM SULFATE HEPTAHYDRATE 2000 MG/HR: 40 INJECTION, SOLUTION INTRAVENOUS at 05:07

## 2022-08-02 RX ADMIN — HYDRALAZINE HYDROCHLORIDE 10 MG: 20 INJECTION INTRAMUSCULAR; INTRAVENOUS at 16:46

## 2022-08-02 RX ADMIN — ACETAMINOPHEN 650 MG: 325 TABLET ORAL at 05:02

## 2022-08-02 RX ADMIN — LABETALOL HYDROCHLORIDE 200 MG: 200 TABLET, FILM COATED ORAL at 08:30

## 2022-08-02 RX ADMIN — MAGNESIUM SULFATE HEPTAHYDRATE 2000 MG/HR: 40 INJECTION, SOLUTION INTRAVENOUS at 14:18

## 2022-08-02 RX ADMIN — ACETAMINOPHEN 650 MG: 325 TABLET ORAL at 14:19

## 2022-08-02 RX ADMIN — CITALOPRAM HYDROBROMIDE 10 MG: 20 TABLET ORAL at 11:32

## 2022-08-02 RX ADMIN — CLONIDINE HYDROCHLORIDE 0.1 MG: 0.1 TABLET ORAL at 17:28

## 2022-08-02 ASSESSMENT — PAIN DESCRIPTION - PAIN TYPE
TYPE: ACUTE PAIN

## 2022-08-02 ASSESSMENT — PAIN DESCRIPTION - LOCATION
LOCATION: HEAD

## 2022-08-02 ASSESSMENT — PAIN DESCRIPTION - DESCRIPTORS
DESCRIPTORS: ACHING

## 2022-08-02 ASSESSMENT — PAIN DESCRIPTION - ONSET
ONSET: ON-GOING
ONSET: AWAKENED FROM SLEEP
ONSET: ON-GOING
ONSET: AWAKENED FROM SLEEP
ONSET: ON-GOING

## 2022-08-02 ASSESSMENT — PAIN - FUNCTIONAL ASSESSMENT
PAIN_FUNCTIONAL_ASSESSMENT: ACTIVITIES ARE NOT PREVENTED

## 2022-08-02 ASSESSMENT — PAIN DESCRIPTION - ORIENTATION
ORIENTATION: ANTERIOR
ORIENTATION: ANTERIOR;MID
ORIENTATION: ANTERIOR
ORIENTATION: MID
ORIENTATION: ANTERIOR;MID
ORIENTATION: ANTERIOR
ORIENTATION: ANTERIOR
ORIENTATION: ANTERIOR;MID
ORIENTATION: MID
ORIENTATION: ANTERIOR;MID

## 2022-08-02 ASSESSMENT — PAIN SCALES - GENERAL
PAINLEVEL_OUTOF10: 1
PAINLEVEL_OUTOF10: 6
PAINLEVEL_OUTOF10: 1
PAINLEVEL_OUTOF10: 6
PAINLEVEL_OUTOF10: 9
PAINLEVEL_OUTOF10: 1
PAINLEVEL_OUTOF10: 6
PAINLEVEL_OUTOF10: 5
PAINLEVEL_OUTOF10: 1
PAINLEVEL_OUTOF10: 6

## 2022-08-02 ASSESSMENT — PAIN DESCRIPTION - FREQUENCY
FREQUENCY: CONTINUOUS

## 2022-08-02 NOTE — FLOWSHEET NOTE
Notified Dr Carlos Matos of pt continued high BP after retaking in 15mins x 2. New order for Procardia XL 60 mg qd and Nephro consult and pt continued headache. Per Dr Carlos Matos at this time, do not follow algorithm.

## 2022-08-02 NOTE — FLOWSHEET NOTE
Pt up to bathroom, BP after getting back into bed 181/84, pt c/o headache 7/10, rechecked BP after 15 mins 181/84, Notified [facility] Midwife, told to recheck BP after 10 mins and notified Dr Rah Pizano.

## 2022-08-02 NOTE — CARE COORDINATION
LSW was informed by Risk Management that we are not allowed to give pt any legal advice. LSW was given the follow numbers for pt to go to receive assistance with getting her baby a passport and returning to [place]. LSW will also place the following info on her discharge instructions on her AVS. LSW also gave pt a paper with this info as well. Melissa Jones in [facility]: \[facility]\"" [facility]: [phone]    [employer]: [phone]    These agencies may be able to help pt with this issue LSW spoke with pt regarding the above info.

## 2022-08-02 NOTE — CONSULTS
Procedure Laterality Date    DILATION AND CURETTAGE OF UTERUS      @ 8 yeras ago missed AB       Medications:   Scheduled Meds:   NIFEdipine  60 mg Oral Daily    labetalol  400 mg Oral 2 times per day    citalopram  10 mg Oral Daily    sodium chloride flush  5-40 mL IntraVENous 2 times per day     Continuous Infusions:   lactated ringers 75 mL/hr at 08/02/22 5727    sodium chloride      magnesium sulfate 2,000 mg/hr (08/02/22 0507)     PRN Meds:.acetaminophen, sodium chloride flush, sodium chloride, calcium gluconate    Allergies:  Patient has no known allergies. Social History:   TOBACCO:   reports that she has never smoked. She has never used smokeless tobacco.  ETOH:   reports that she does not currently use alcohol. OCCUPATION:      Family History:       Problem Relation Age of Onset    Diabetes Mother        REVIEW OF SYSTEMS:  Negative except for weak tired anxious blurry vision headache. Physical Exam:    I/O: 08/01 0701 - 08/02 0700  In: 1118.7 [I.V.:1083.6]  Out: 1050 [Urine:1050]    Vitals: BP (!) 149/70   Pulse 70   Temp 98.3 °F (36.8 °C) (Oral)   Resp 16   Wt 255 lb (115.7 kg)   SpO2 99%   BMI 41.16 kg/m²   General appearance: awake weak positive blurry vision positive headache  HEENT: Head: Normal, normocephalic, atraumatic. Neck: supple, symmetrical, trachea midline  Lungs: diminished breath sounds bilaterally  Heart: S1, S2 normal  Abdomen: abnormal findings:  soft NT  Extremities: edema trace  Neurologic: Mental status: alertness: alert but anxious      CBC:   Recent Labs     08/01/22  1855   WBC 5.3   HGB 15.8        BMP:    Recent Labs     08/01/22  1855      K 4.4      CO2 23   BUN 18   CREATININE 0.6   GLUCOSE 87     Hepatic:   Recent Labs     08/01/22  1855   AST 19   ALT 21   BILITOT 0.2   ALKPHOS 111     Troponin: No results for input(s): TROPONINI in the last 72 hours. BNP: No results for input(s): BNP in the last 72 hours.   Lipids: No results for input(s): CHOL, HDL in the last 72 hours. Invalid input(s): LDLCALCU  ABGs: No results found for: PHART, PO2ART, PGZ8FQK  INR: No results for input(s): INR in the last 72 hours.   Renal Labs  Albumin:    Lab Results   Component Value Date/Time    LABALBU 4.1 08/01/2022 06:55 PM     Calcium:    Lab Results   Component Value Date/Time    CALCIUM 9.1 08/01/2022 06:55 PM     Phosphorus:  No results found for: PHOS  U/A:    Lab Results   Component Value Date/Time    NITRU NEGATIVE 08/01/2022 05:50 PM    COLORU YELLOW 08/01/2022 05:50 PM    WBCUA 53 08/01/2022 05:50 PM    RBCUA 15 08/01/2022 05:50 PM    MUCUS FEW 08/01/2022 05:50 PM    TRICHOMONAS NONE SEEN 08/01/2022 05:50 PM    BACTERIA NEGATIVE 08/01/2022 05:50 PM    CLARITYU CLOUDY 08/01/2022 05:50 PM    SPECGRAV >1.030 08/01/2022 05:50 PM    UROBILINOGEN 1.0 08/01/2022 05:50 PM    BILIRUBINUR NEGATIVE 08/01/2022 05:50 PM    BLOODU LARGE 08/01/2022 05:50 PM    KETUA TRACE 08/01/2022 05:50 PM     ABG:  No results found for: PHART, ECE2CLS, PO2ART, JBG3USN, BEART, THGBART, TWW4NAJ, V2ZXVWIT  HgBA1c:  No results found for: LABA1C  Microalbumen/Creatinine ratio:  No components found for: RUCREAT  TSH:  No results found for: TSH  IRON:  No results found for: IRON  Iron Saturation:  No components found for: PERCENTFE  TIBC:  No results found for: TIBC  FERRITIN:  No results found for: FERRITIN  RPR:  No results found for: RPR  JAY:  No results found for: ANATITER, JAY  24 Hour Urine for Creatinine Clearance:  No components found for: CREAT4, UHRS10, UTV10  -----------------------------------------------------------------      Assessment and Recommendations     Patient Active Problem List   Diagnosis Code    Hypertension affecting pregnancy, antepartum O16.9    Elevated blood pressure affecting pregnancy in third trimester, antepartum O16.3    37 weeks gestation of pregnancy Z3A.37    Gestational hypertension with significant proteinuria, delivered O14.94    Postpartum hypertension O16.5     Impression plan  #1 postpartum hypertension  #2 anxiety  #3 status post third trimester delivery  #4 overweight    Plan  #1 maintain on nifedipine increase low dose of labetalol to 400 mg twice daily maintain a low-sodium diet and try to maintain systolic blood pressure less than 140 for now  #2 start on low-dose Celexa as anxiety is a major factor in overall symptomatology  #3 patient is 2 weeks postdelivery planning on breast-feeding will monitor closely and baby is healthy and stable  #4 need to follow better diet need to work on weight loss as well  #5 no significant proteinuria renal function stable  No other endorgan damage noted most likely combination of essential hypertension and anxiety is leading to above issues with blood pressure we will try combination of labetalol and nifedipine to control blood pressure try to wean off the magnesium sulfate drip and try low-dose Celexa to help in the above symptomatology.   When systolic blood pressure remains less than 130 to 140 can work on outpatient therapy  Will follow  Electronically signed by Katie Irvin MD on 8/2/2022 at 10:28 AM

## 2022-08-02 NOTE — DISCHARGE INSTRUCTIONS
Mark Carrion in [facility]: [facility]: [phone]    [place]  [employer]: [phone]

## 2022-08-02 NOTE — FLOWSHEET NOTE
Pt ambulated to bathroom, steady, stand-by assist, pt denies any weakness nor lightheadedness. Pt back to bed, call light within reach.

## 2022-08-02 NOTE — FLOWSHEET NOTE
Pt states that sister of ROSHAN is watching her baby, and she has tried calling her multiple times to see baby and she will not answer, pt is visible upset and crying, new order for Social Service consult.

## 2022-08-02 NOTE — FLOWSHEET NOTE
Pt complaining of headache with no relief with tylenol, pt given ice pack to place on head, pt denies any other needs this time, call light within reach

## 2022-08-02 NOTE — CARE COORDINATION
LSW was consulted to speak with pt regarding her rights as a mother to be able to get a Passport for her son and take him to [facility]. Pt and FOB are not . FOB has signed the affidavit and his name is on the birth certificate. In the state of [place] since the pt is not  to the FOB she hold sole custody of the baby until goes to a  and he is granted some form of custody. [facility]. YADIW looked on line and found that since FOB has signed the birth certificate he has to give consent or mom has to so proof of sole custody. Pt does have sole custody because they are not  and he has not gone to a . ROSA MARIA contacted several attorneys just to ask this question if mom needs FOB consent when she has sole custody. Unfortunately they  all wanted a fee for this LSW to get the answer. ROSA MARIA spoke with  director Renee and informed her of the situation. Renee stated that she will talk with the hospital legal counselor and see if they can help with this issue. Waiting on a return call.

## 2022-08-03 VITALS
TEMPERATURE: 99.2 F | DIASTOLIC BLOOD PRESSURE: 77 MMHG | WEIGHT: 255 LBS | RESPIRATION RATE: 16 BRPM | SYSTOLIC BLOOD PRESSURE: 146 MMHG | BODY MASS INDEX: 41.16 KG/M2 | OXYGEN SATURATION: 97 % | HEART RATE: 70 BPM

## 2022-08-03 PROCEDURE — 6370000000 HC RX 637 (ALT 250 FOR IP): Performed by: OBSTETRICS & GYNECOLOGY

## 2022-08-03 PROCEDURE — 2580000003 HC RX 258: Performed by: OBSTETRICS & GYNECOLOGY

## 2022-08-03 PROCEDURE — 6370000000 HC RX 637 (ALT 250 FOR IP): Performed by: INTERNAL MEDICINE

## 2022-08-03 PROCEDURE — 99238 HOSP IP/OBS DSCHRG MGMT 30/<: CPT | Performed by: OBSTETRICS & GYNECOLOGY

## 2022-08-03 RX ORDER — CITALOPRAM 10 MG/1
10 TABLET ORAL DAILY
Qty: 30 TABLET | Refills: 3 | Status: SHIPPED | OUTPATIENT
Start: 2022-08-03

## 2022-08-03 RX ORDER — LABETALOL 200 MG/1
400 TABLET, FILM COATED ORAL EVERY 12 HOURS SCHEDULED
Qty: 60 TABLET | Refills: 3 | Status: SHIPPED | OUTPATIENT
Start: 2022-08-03 | End: 2022-08-03 | Stop reason: SDUPTHER

## 2022-08-03 RX ORDER — CITALOPRAM 10 MG/1
10 TABLET ORAL DAILY
Qty: 30 TABLET | Refills: 3 | Status: SHIPPED | OUTPATIENT
Start: 2022-08-03 | End: 2022-08-03 | Stop reason: SDUPTHER

## 2022-08-03 RX ORDER — NIFEDIPINE 30 MG/1
60 TABLET, EXTENDED RELEASE ORAL DAILY
Qty: 30 TABLET | Refills: 3 | Status: SHIPPED | OUTPATIENT
Start: 2022-08-03 | End: 2022-08-03 | Stop reason: SDUPTHER

## 2022-08-03 RX ORDER — LABETALOL 200 MG/1
400 TABLET, FILM COATED ORAL EVERY 12 HOURS SCHEDULED
Qty: 60 TABLET | Refills: 3 | Status: SHIPPED | OUTPATIENT
Start: 2022-08-03

## 2022-08-03 RX ORDER — NIFEDIPINE 30 MG/1
60 TABLET, EXTENDED RELEASE ORAL DAILY
Qty: 30 TABLET | Refills: 3 | Status: SHIPPED | OUTPATIENT
Start: 2022-08-03

## 2022-08-03 RX ADMIN — NIFEDIPINE 60 MG: 30 TABLET, FILM COATED, EXTENDED RELEASE ORAL at 09:42

## 2022-08-03 RX ADMIN — LABETALOL HYDROCHLORIDE 400 MG: 200 TABLET, FILM COATED ORAL at 09:42

## 2022-08-03 RX ADMIN — CITALOPRAM HYDROBROMIDE 10 MG: 20 TABLET ORAL at 09:43

## 2022-08-03 RX ADMIN — SODIUM CHLORIDE, POTASSIUM CHLORIDE, SODIUM LACTATE AND CALCIUM CHLORIDE: 600; 310; 30; 20 INJECTION, SOLUTION INTRAVENOUS at 04:21

## 2022-08-03 ASSESSMENT — PAIN DESCRIPTION - DESCRIPTORS
DESCRIPTORS: ACHING

## 2022-08-03 ASSESSMENT — PAIN DESCRIPTION - PAIN TYPE
TYPE: ACUTE PAIN
TYPE: ACUTE PAIN

## 2022-08-03 ASSESSMENT — PAIN DESCRIPTION - ONSET
ONSET: ON-GOING
ONSET: ON-GOING

## 2022-08-03 ASSESSMENT — PAIN SCALES - GENERAL
PAINLEVEL_OUTOF10: 1
PAINLEVEL_OUTOF10: 1

## 2022-08-03 ASSESSMENT — PAIN DESCRIPTION - LOCATION
LOCATION: HEAD
LOCATION: HEAD

## 2022-08-03 ASSESSMENT — PAIN DESCRIPTION - ORIENTATION
ORIENTATION: ANTERIOR;MID
ORIENTATION: ANTERIOR;MID

## 2022-08-03 ASSESSMENT — PAIN DESCRIPTION - FREQUENCY
FREQUENCY: CONTINUOUS
FREQUENCY: CONTINUOUS

## 2022-08-03 NOTE — FLOWSHEET NOTE
I helped patient set up [facility] on her phone and saved the coupons on her phone for her rxs. Accompanied patient to elevator, patient left the birthing center ambulatory after receiving discharge instructions. Patient is to follow up with Dr. Zenon Rosa in 2 weeks and keep  her prior scheduled appointment at the [facility]. Patient is to  her medicine at [facility]. Patient voiced understanding with no further questions.

## 2022-08-03 NOTE — PROGRESS NOTES
Nephrology Progress Note  8/3/2022 7:27 AM  Subjective: Interval History: Helen Alberto is a 44 y.o. female with mild headache but overall improving no acute distress today appears less anxious        Data:   Scheduled Meds:   NIFEdipine  60 mg Oral Daily    labetalol  400 mg Oral 2 times per day    citalopram  10 mg Oral Daily    sodium chloride flush  5-40 mL IntraVENous 2 times per day     Continuous Infusions:   lactated ringers 75 mL/hr at 08/03/22 0421    sodium chloride           CBC   Recent Labs     08/01/22  1855   WBC 5.3   HGB 15.8   HCT 51.4*         BMP   Recent Labs     08/01/22  1855      K 4.4      CO2 23   BUN 18   CREATININE 0.6     Hepatic:   Recent Labs     08/01/22  1855   AST 19   ALT 21   BILITOT 0.2   ALKPHOS 111     Troponin: No results for input(s): TROPONINI in the last 72 hours. BNP: No results for input(s): BNP in the last 72 hours. Lipids: No results for input(s): CHOL, HDL in the last 72 hours. Invalid input(s): LDLCALCU  ABGs: No results found for: PHART, PO2ART, ASS6XHS  INR: No results for input(s): INR in the last 72 hours.   Renal Labs  Albumin:    Lab Results   Component Value Date/Time    LABALBU 4.1 08/01/2022 06:55 PM     Calcium:    Lab Results   Component Value Date/Time    CALCIUM 9.1 08/01/2022 06:55 PM     Phosphorus:  No results found for: PHOS  U/A:    Lab Results   Component Value Date/Time    NITRU NEGATIVE 08/01/2022 05:50 PM    COLORU YELLOW 08/01/2022 05:50 PM    WBCUA 53 08/01/2022 05:50 PM    RBCUA 15 08/01/2022 05:50 PM    MUCUS FEW 08/01/2022 05:50 PM    TRICHOMONAS NONE SEEN 08/01/2022 05:50 PM    BACTERIA NEGATIVE 08/01/2022 05:50 PM    CLARITYU CLOUDY 08/01/2022 05:50 PM    SPECGRAV >1.030 08/01/2022 05:50 PM    UROBILINOGEN 1.0 08/01/2022 05:50 PM    BILIRUBINUR NEGATIVE 08/01/2022 05:50 PM    BLOODU LARGE 08/01/2022 05:50 PM    KETUA TRACE 08/01/2022 05:50 PM     ABG:  No results found for: PHART, OBZ9VGL, PO2ART, XFY7XLO, BEART, THGBART, JMH7EVB, J2NPJKCG  HgBA1c:  No results found for: LABA1C  Microalbumen/Creatinine ratio:  No components found for: RUCREAT  TSH:  No results found for: TSH  IRON:  No results found for: IRON  Iron Saturation:  No components found for: PERCENTFE  TIBC:  No results found for: TIBC  FERRITIN:  No results found for: FERRITIN  RPR:  No results found for: RPR  JAY:  No results found for: ANATITER, JAY  24 Hour Urine for Creatinine Clearance:  No components found for: CREAT4, UHRS10, UTV10      Objective:   I/O: 08/02 0701 - 08/03 0700  In: -   Out: 1400 [Urine:1400]  I/O last 3 completed shifts: In: 1118.7 [I.V.:1083.6; IV Piggyback:35.1]  Out: 7897 [Urine:2450]  No intake/output data recorded. Vitals: BP (!) 120/59   Pulse 68   Temp 98.8 °F (37.1 °C) (Oral)   Resp 16   Wt 255 lb (115.7 kg)   SpO2 98%   BMI 41.16 kg/m²  {  General appearance: awake weak  HEENT: Head: Normal, normocephalic, atraumatic.   Neck: supple, symmetrical, trachea midline  Lungs: diminished breath sounds bilaterally  Heart: S1, S2 normal  Abdomen: abnormal findings:  soft nt  Extremities: edema trace  Neurologic: Mental status: alertness: alert        Assessment and Plan:      IMP:  #1 postpartum hypertension  #2 anxiety  #3 status post third trimester delivery  #4 overweight    Plan     #1 blood pressure overall improved using combination nifedipine and labetalol did not need IV hydralazine overnight  #2 maintain on low-dose Celexa  #3 can maintain breast-feeding at this time  #4 work on weight loss when discharged with better diet    Overall improved symptoms improved okay for discharge today with current medications on discharge we will stop IV fluids and follow-up with me in 2 weeks outpatient           Cindy Ham MD, MD

## 2022-08-03 NOTE — CARE COORDINATION
LSW received a call from RN on L&D informing this LSW that pt is going home on new discharging meds and asked if pt can get assistance. Pt was helped by med assist last week and she can not get help at this time. Per MA note from last admission. LSW looked on [facility] and she can get all of the meds at [place] using coupons from [facility] for the following. .. Labetalol for $12.36   Procardia XL $13.26   Celexa $6.00  RN to inform pt of this information.

## 2022-08-04 NOTE — DISCHARGE SUMMARY
Physician Discharge Summary     Patient ID:  Helen Alberto  [id number]  34 y.o.  1985    Admit date: 2022    Discharge date and time: 8/3/2022     Admitting Physician: No att. providers found    Discharge Diagnoses: Postpartum hypertension [O16.5], postpartum severe preeclampsia, generalized anxiety disorder    Discharged Condition: good    Indication for Admission: Severely elevated blood pressures    Procedures Performed: Magnesium sulfate for seizure prophylaxis, IV labetalol, IV hydralazine, oral antihypertensive, nephrology consult    Hospital Course: Ivy Mauro is a 43 yo  who had an  on . She was induced for a history of cHTN and developed preeclampsia on PPD0. She was treated with MgSO4 postpartum. She presented to the [facility] today with epigastric pain, chest pain, head ache, blurred vision and severe range pressures 170s/100s. Upon arrival to triage she continues to have severe range Bps, blurred vision and mild epigastric pain. Patient was placed on magnesium sulfate and placed on labetalol followed by Procardia XL. Nephrology consult was obtained and appreciated. Patient's blood pressure began to improve and her symptoms resolved    Discharge Exam:  BP (!) 146/77   Pulse 70   Temp 99.2 °F (37.3 °C) (Oral)   Resp 16   Wt 255 lb (115.7 kg)   SpO2 97%   BMI 41.16 kg/m²   General appearance: alert, appears stated age, and cooperative  Neck: supple, symmetrical, trachea midline  Lungs: clear to auscultation bilaterally  Heart: regular rate and rhythm  Abdomen: soft, non-tender; bowel sounds normal; no masses,  no organomegaly  Extremities: extremities normal, atraumatic, no cyanosis or edema  Neurologic: Reflexes: 2+ and symmetric  2+ patellar reflexes, no clonus    Disposition: home    Patient Instructions:    Activity: no sex for 6 weeks  Diet: renal diet      Discharge Medication:      Medication List        START taking these medications      citalopram 10 MG tablet  Commonly known as: CELEXA  Take 1 tablet by mouth in the morning. NIFEdipine 30 MG extended release tablet  Commonly known as: PROCARDIA XL  Take 2 tablets by mouth in the morning. CHANGE how you take these medications      labetalol 200 MG tablet  Commonly known as: NORMODYNE  Take 2 tablets by mouth in the morning and 2 tablets before bedtime. What changed: how much to take            CONTINUE taking these medications      ibuprofen 800 MG tablet  Commonly known as: ADVIL;MOTRIN  Take 1 tablet by mouth every 8 hours as needed for Pain            STOP taking these medications      methyldopa 500 MG tablet  Commonly known as: ALDOMET               Where to Get Your Medications        These medications were sent to [address] [place] [address]. - [phone]  [facility], [address]      Phone: [phone]   citalopram 10 MG tablet  labetalol 200 MG tablet  NIFEdipine 30 MG extended release tablet          Follow-up with [facility] in 1 week. Follow-up with Dr. Mer Pichardo in 2 weeks  Return ASAP for headache, visual changes, chest pain, shortness of breath, any other significant symptoms.     Signed:  Lauren Parisi MD  8/3/2022  9:30 PM

## 2022-09-21 ENCOUNTER — OFFICE VISIT (OUTPATIENT)
Dept: OBGYN | Age: 37
End: 2022-09-21
Payer: MEDICAID

## 2022-09-21 VITALS
DIASTOLIC BLOOD PRESSURE: 78 MMHG | SYSTOLIC BLOOD PRESSURE: 136 MMHG | BODY MASS INDEX: 36.48 KG/M2 | WEIGHT: 227 LBS | HEIGHT: 66 IN

## 2022-09-21 DIAGNOSIS — N92.1 MENORRHAGIA WITH IRREGULAR CYCLE: Primary | ICD-10-CM

## 2022-09-21 PROBLEM — N92.0 MENORRHAGIA: Status: ACTIVE | Noted: 2022-09-21

## 2022-09-21 PROCEDURE — G8427 DOCREV CUR MEDS BY ELIG CLIN: HCPCS | Performed by: NURSE PRACTITIONER

## 2022-09-21 PROCEDURE — 99214 OFFICE O/P EST MOD 30 MIN: CPT | Performed by: NURSE PRACTITIONER

## 2022-09-21 PROCEDURE — G8417 CALC BMI ABV UP PARAM F/U: HCPCS | Performed by: NURSE PRACTITIONER

## 2022-09-21 PROCEDURE — 1036F TOBACCO NON-USER: CPT | Performed by: NURSE PRACTITIONER

## 2022-09-21 ASSESSMENT — PATIENT HEALTH QUESTIONNAIRE - PHQ9
SUM OF ALL RESPONSES TO PHQ QUESTIONS 1-9: 0
SUM OF ALL RESPONSES TO PHQ9 QUESTIONS 1 & 2: 0
SUM OF ALL RESPONSES TO PHQ QUESTIONS 1-9: 0
1. LITTLE INTEREST OR PLEASURE IN DOING THINGS: 0
SUM OF ALL RESPONSES TO PHQ QUESTIONS 1-9: 0
2. FEELING DOWN, DEPRESSED OR HOPELESS: 0
SUM OF ALL RESPONSES TO PHQ QUESTIONS 1-9: 0

## 2022-09-21 NOTE — PROGRESS NOTES
9/21/22    Yulisa Edmond  1985    Chief Complaint   Patient presents with    Menorrhagia     Pt c/o menorrhagia x 1 month,menses last 4 days, 2 days heavy with clots, 3-4 pads on heavy days. Pt delivered 7/18/22. U/s today. Yulisa Edmond is a 44 y.o. female who presents today for f/u after u/s (in office today) and labs (done at [facility])    Past Medical History:   Diagnosis Date    Hypertension     Menorrhagia        Past Surgical History:   Procedure Laterality Date    DILATION AND CURETTAGE OF UTERUS      @ 8 yeras ago missed AB       Social History     Tobacco Use    Smoking status: Never    Smokeless tobacco: Never   Vaping Use    Vaping Use: Never used   Substance Use Topics    Alcohol use: Not Currently    Drug use: Never       Family History   Problem Relation Age of Onset    Diabetes Mother        Current Outpatient Medications   Medication Sig Dispense Refill    labetalol (NORMODYNE) 200 MG tablet Take 2 tablets by mouth in the morning and 2 tablets before bedtime. 60 tablet 3    citalopram (CELEXA) 10 MG tablet Take 1 tablet by mouth in the morning. 30 tablet 3    NIFEdipine (PROCARDIA XL) 30 MG extended release tablet Take 2 tablets by mouth in the morning. (Patient not taking: Reported on 9/21/2022) 30 tablet 3    ibuprofen (ADVIL;MOTRIN) 800 MG tablet Take 1 tablet by mouth every 8 hours as needed for Pain 60 tablet 2     No current facility-administered medications for this visit. No Known Allergies    [id number]    There is no immunization history for the selected administration types on file for this patient. Review of Systems    /78   Ht 5' 6\" (1.676 m)   Wt 227 lb (103 kg)   Breastfeeding Unknown   BMI 36.64 kg/m²     Physical Exam    No results found for this visit on 09/21/22. ASSESSMENT AND PLAN   Diagnosis Orders   1.  Menorrhagia with irregular cycle  US NON OB TRANSVAGINAL        Labs and u/s reviewed, will trial Tahira x 2-3 months          No follow-ups on file.    Weston Luther, APRN - CNP

## 2023-04-22 ENCOUNTER — HOSPITAL ENCOUNTER (EMERGENCY)
Age: 38
Discharge: HOME | End: 2023-04-22

## 2023-04-22 ENCOUNTER — APPOINTMENT (OUTPATIENT)
Dept: GENERAL RADIOLOGY | Age: 38
End: 2023-04-22

## 2023-04-22 VITALS
RESPIRATION RATE: 16 BRPM | DIASTOLIC BLOOD PRESSURE: 75 MMHG | HEIGHT: 66 IN | OXYGEN SATURATION: 98 % | SYSTOLIC BLOOD PRESSURE: 143 MMHG | TEMPERATURE: 99.5 F | BODY MASS INDEX: 36.64 KG/M2 | HEART RATE: 100 BPM

## 2023-04-22 DIAGNOSIS — I10 ESSENTIAL HYPERTENSION: ICD-10-CM

## 2023-04-22 DIAGNOSIS — J02.0 STREP PHARYNGITIS: Primary | ICD-10-CM

## 2023-04-22 LAB
ALBUMIN SERPL-MCNC: 3.6 GM/DL (ref 3.4–5)
ALP BLD-CCNC: 113 IU/L (ref 40–129)
ALT SERPL-CCNC: 21 U/L (ref 10–40)
ANION GAP SERPL CALCULATED.3IONS-SCNC: 12 MMOL/L (ref 4–16)
AST SERPL-CCNC: 20 IU/L (ref 15–37)
BASOPHILS ABSOLUTE: 0 K/CU MM
BASOPHILS RELATIVE PERCENT: 0.2 % (ref 0–1)
BILIRUB SERPL-MCNC: 0.3 MG/DL (ref 0–1)
BUN SERPL-MCNC: 11 MG/DL (ref 6–23)
CALCIUM SERPL-MCNC: 8.7 MG/DL (ref 8.3–10.6)
CHLORIDE BLD-SCNC: 97 MMOL/L (ref 99–110)
CO2: 23 MMOL/L (ref 21–32)
CREAT SERPL-MCNC: 0.7 MG/DL (ref 0.6–1.1)
CULTURE: NORMAL
DIFFERENTIAL TYPE: ABNORMAL
EKG ATRIAL RATE: 108 BPM
EKG DIAGNOSIS: NORMAL
EKG P AXIS: 46 DEGREES
EKG P-R INTERVAL: 142 MS
EKG Q-T INTERVAL: 324 MS
EKG QRS DURATION: 94 MS
EKG QTC CALCULATION (BAZETT): 434 MS
EKG R AXIS: 11 DEGREES
EKG T AXIS: 35 DEGREES
EKG VENTRICULAR RATE: 108 BPM
EOSINOPHILS ABSOLUTE: 0.1 K/CU MM
EOSINOPHILS RELATIVE PERCENT: 0.4 % (ref 0–3)
GFR SERPL CREATININE-BSD FRML MDRD: >60 ML/MIN/1.73M2
GLUCOSE SERPL-MCNC: 106 MG/DL (ref 70–99)
HCG QUALITATIVE: NEGATIVE
HCT VFR BLD CALC: 41.7 % (ref 37–47)
HEMOGLOBIN: 13.1 GM/DL (ref 12.5–16)
IMMATURE NEUTROPHIL %: 0.5 % (ref 0–0.43)
INFLUENZA A ANTIGEN: NOT DETECTED
INFLUENZA B ANTIGEN: NOT DETECTED
LACTIC ACID, SEPSIS: 1.2 MMOL/L (ref 0.5–1.9)
LYMPHOCYTES ABSOLUTE: 1.7 K/CU MM
LYMPHOCYTES RELATIVE PERCENT: 12.8 % (ref 24–44)
Lab: NORMAL
MCH RBC QN AUTO: 27.2 PG (ref 27–31)
MCHC RBC AUTO-ENTMCNC: 31.4 % (ref 32–36)
MCV RBC AUTO: 86.7 FL (ref 78–100)
MONOCYTES ABSOLUTE: 0.5 K/CU MM
MONOCYTES RELATIVE PERCENT: 3.7 % (ref 0–4)
NUCLEATED RBC %: 0 %
PDW BLD-RTO: 13.5 % (ref 11.7–14.9)
PLATELET # BLD: 257 K/CU MM (ref 140–440)
PMV BLD AUTO: 10.3 FL (ref 7.5–11.1)
POTASSIUM SERPL-SCNC: 4.2 MMOL/L (ref 3.5–5.1)
RBC # BLD: 4.81 M/CU MM (ref 4.2–5.4)
SARS-COV-2 RDRP RESP QL NAA+PROBE: NOT DETECTED
SEGMENTED NEUTROPHILS ABSOLUTE COUNT: 10.8 K/CU MM
SEGMENTED NEUTROPHILS RELATIVE PERCENT: 82.4 % (ref 36–66)
SODIUM BLD-SCNC: 132 MMOL/L (ref 135–145)
SOURCE: NORMAL
SPECIMEN: NORMAL
STREP A DIRECT SCREEN: POSITIVE
TOTAL IMMATURE NEUTOROPHIL: 0.06 K/CU MM
TOTAL NUCLEATED RBC: 0 K/CU MM
TOTAL PROTEIN: 8.1 GM/DL (ref 6.4–8.2)
TROPONIN T: <0.01 NG/ML
WBC # BLD: 13.1 K/CU MM (ref 4–10.5)

## 2023-04-22 PROCEDURE — 85025 COMPLETE CBC W/AUTO DIFF WBC: CPT

## 2023-04-22 PROCEDURE — 87635 SARS-COV-2 COVID-19 AMP PRB: CPT

## 2023-04-22 PROCEDURE — 99285 EMERGENCY DEPT VISIT HI MDM: CPT

## 2023-04-22 PROCEDURE — 84703 CHORIONIC GONADOTROPIN ASSAY: CPT

## 2023-04-22 PROCEDURE — 80053 COMPREHEN METABOLIC PANEL: CPT

## 2023-04-22 PROCEDURE — 87081 CULTURE SCREEN ONLY: CPT

## 2023-04-22 PROCEDURE — 83605 ASSAY OF LACTIC ACID: CPT

## 2023-04-22 PROCEDURE — 71045 X-RAY EXAM CHEST 1 VIEW: CPT

## 2023-04-22 PROCEDURE — 2580000003 HC RX 258: Performed by: PHYSICIAN ASSISTANT

## 2023-04-22 PROCEDURE — 87502 INFLUENZA DNA AMP PROBE: CPT

## 2023-04-22 PROCEDURE — 6370000000 HC RX 637 (ALT 250 FOR IP): Performed by: PHYSICIAN ASSISTANT

## 2023-04-22 PROCEDURE — 6360000002 HC RX W HCPCS: Performed by: PHYSICIAN ASSISTANT

## 2023-04-22 PROCEDURE — 84484 ASSAY OF TROPONIN QUANT: CPT

## 2023-04-22 PROCEDURE — 87430 STREP A AG IA: CPT

## 2023-04-22 PROCEDURE — 96374 THER/PROPH/DIAG INJ IV PUSH: CPT

## 2023-04-22 PROCEDURE — 86318 IA INFECTIOUS AGENT ANTIBODY: CPT

## 2023-04-22 RX ORDER — ACETAMINOPHEN 500 MG
1000 TABLET ORAL ONCE
Status: COMPLETED | OUTPATIENT
Start: 2023-04-22 | End: 2023-04-22

## 2023-04-22 RX ORDER — LABETALOL 200 MG/1
400 TABLET, FILM COATED ORAL EVERY 12 HOURS SCHEDULED
Qty: 60 TABLET | Refills: 3 | Status: SHIPPED | OUTPATIENT
Start: 2023-04-22

## 2023-04-22 RX ORDER — DEXAMETHASONE SODIUM PHOSPHATE 10 MG/ML
6 INJECTION, SOLUTION INTRAMUSCULAR; INTRAVENOUS ONCE
Status: COMPLETED | OUTPATIENT
Start: 2023-04-22 | End: 2023-04-22

## 2023-04-22 RX ORDER — AMOXICILLIN 250 MG/1
500 CAPSULE ORAL ONCE
Status: COMPLETED | OUTPATIENT
Start: 2023-04-22 | End: 2023-04-22

## 2023-04-22 RX ORDER — 0.9 % SODIUM CHLORIDE 0.9 %
1000 INTRAVENOUS SOLUTION INTRAVENOUS ONCE
Status: COMPLETED | OUTPATIENT
Start: 2023-04-22 | End: 2023-04-22

## 2023-04-22 RX ORDER — SODIUM CHLORIDE 9 MG/ML
INJECTION, SOLUTION INTRAVENOUS CONTINUOUS
Status: DISCONTINUED | OUTPATIENT
Start: 2023-04-22 | End: 2023-04-22 | Stop reason: HOSPADM

## 2023-04-22 RX ORDER — AMOXICILLIN 500 MG/1
500 CAPSULE ORAL 2 TIMES DAILY
Qty: 20 CAPSULE | Refills: 0 | Status: SHIPPED | OUTPATIENT
Start: 2023-04-22 | End: 2023-05-02

## 2023-04-22 RX ORDER — IBUPROFEN 600 MG/1
600 TABLET ORAL EVERY 6 HOURS PRN
Qty: 20 TABLET | Refills: 0 | Status: SHIPPED | OUTPATIENT
Start: 2023-04-22 | End: 2023-05-22

## 2023-04-22 RX ORDER — IBUPROFEN 600 MG/1
600 TABLET ORAL ONCE
Status: COMPLETED | OUTPATIENT
Start: 2023-04-22 | End: 2023-04-22

## 2023-04-22 RX ORDER — ACETAMINOPHEN 325 MG/1
650 TABLET ORAL EVERY 6 HOURS PRN
Qty: 40 TABLET | Refills: 0 | Status: SHIPPED | OUTPATIENT
Start: 2023-04-22

## 2023-04-22 RX ADMIN — AMOXICILLIN 500 MG: 250 CAPSULE ORAL at 15:04

## 2023-04-22 RX ADMIN — IBUPROFEN 600 MG: 600 TABLET, FILM COATED ORAL at 14:17

## 2023-04-22 RX ADMIN — DEXAMETHASONE SODIUM PHOSPHATE 6 MG: 10 INJECTION INTRAMUSCULAR; INTRAVENOUS at 14:53

## 2023-04-22 RX ADMIN — SODIUM CHLORIDE: 9 INJECTION, SOLUTION INTRAVENOUS at 14:53

## 2023-04-22 RX ADMIN — ACETAMINOPHEN 1000 MG: 500 TABLET ORAL at 14:17

## 2023-04-22 RX ADMIN — SODIUM CHLORIDE 1000 ML: 9 INJECTION, SOLUTION INTRAVENOUS at 14:17

## 2023-04-22 NOTE — ED PROVIDER NOTES
EMERGENCY DEPARTMENT ENCOUNTER        Pt Name: Elizabeth Garnett  MRN: [id number]  [place] 1985  Date of evaluation: 4/22/2023  Provider: JOSE Quick  PCP: No primary care provider on file. SUHAS. I have evaluated this patient. My supervising physician was available for consultation. Triage CHIEF COMPLAINT       Chief Complaint   Patient presents with    Pharyngitis         HISTORY OF PRESENT ILLNESS      Chief Complaint: Sore throat, fever, weakness, chest pain    Elizabeth Garnett is a 44 y.o.  female who presents to the emergency department today with a chief complaint of fever, sore throat and weakness. States has been developing over the last week with her worsening symptoms today. She states she has pain with swallowing, she has right-sided cervical lymphadenopathy. Denies voice change, she is tolerating her own secretions. No airway compromise. Has a mild cough no profound shortness of breath. Mentions some underlying chest tightness, does not appear to be exertional and or pleuritic in nature. Patient denies any abdominal pain or nausea and vomiting. Patient does mention that she had COVID in [place] but overall had a well course, no other significant history of recurrent lung infection. She does have a history of hypertension. She is not a diabetic. No other significant underlying medical issues, no recent sick contacts    Nursing Notes were all reviewed and agreed with or any disagreements were addressed in the HPI. REVIEW OF SYSTEMS     At least 10 systems reviewed and otherwise acutely negative except as in the [address]      PAST MEDICAL HISTORY     Past Medical History:   Diagnosis Date    Hypertension     Menorrhagia        SURGICAL HISTORY     Past Surgical History:   Procedure Laterality Date    DILATION AND CURETTAGE OF UTERUS      @ 8 yeras ago missed AB       CURRENTMEDICATIONS       Previous Medications    CITALOPRAM (CELEXA) 10 MG TABLET    Take 1 tablet by

## 2023-04-22 NOTE — ED PROVIDER NOTES
ECG:  Sinus tachycardia. Ventricular rate of 108. . There is no ectopy QTc 434. There are no abnormal cells no depression.        Geena Goss MD  04/22/23 1400